# Patient Record
Sex: MALE | Race: WHITE | Employment: FULL TIME | URBAN - METROPOLITAN AREA
[De-identification: names, ages, dates, MRNs, and addresses within clinical notes are randomized per-mention and may not be internally consistent; named-entity substitution may affect disease eponyms.]

---

## 2017-05-26 ENCOUNTER — HOSPITAL ENCOUNTER (OUTPATIENT)
Dept: NON INVASIVE DIAGNOSTICS | Age: 57
Discharge: HOME | End: 2017-05-26
Payer: COMMERCIAL

## 2017-05-26 ENCOUNTER — HOSPITAL ENCOUNTER (OUTPATIENT)
Dept: CT IMAGING | Age: 57
Discharge: HOME | End: 2017-05-26
Payer: COMMERCIAL

## 2017-05-26 DIAGNOSIS — I71.21 ANEURYSM OF ASCENDING AORTA: ICD-10-CM

## 2017-05-26 DIAGNOSIS — I35.0 AORTIC STENOSIS, MODERATE: ICD-10-CM

## 2017-05-26 PROCEDURE — 71275 CT ANGIOGRAPHY CHEST: CPT

## 2017-05-26 PROCEDURE — 6360000004 HC RX CONTRAST MEDICATION: Performed by: RADIOLOGY

## 2017-05-26 PROCEDURE — 93306 TTE W/DOPPLER COMPLETE: CPT

## 2017-05-26 RX ADMIN — IOPAMIDOL 100 ML: 755 INJECTION, SOLUTION INTRAVENOUS at 09:17

## 2017-06-29 ENCOUNTER — OFFICE VISIT (OUTPATIENT)
Dept: CARDIOLOGY | Age: 57
End: 2017-06-29

## 2017-06-29 VITALS
HEIGHT: 67 IN | HEART RATE: 59 BPM | DIASTOLIC BLOOD PRESSURE: 80 MMHG | OXYGEN SATURATION: 98 % | BODY MASS INDEX: 28.88 KG/M2 | SYSTOLIC BLOOD PRESSURE: 130 MMHG | WEIGHT: 184 LBS

## 2017-06-29 DIAGNOSIS — E78.5 DYSLIPIDEMIA: ICD-10-CM

## 2017-06-29 DIAGNOSIS — I10 ESSENTIAL HYPERTENSION: Primary | ICD-10-CM

## 2017-06-29 DIAGNOSIS — Z78.9 STATIN INTOLERANCE: ICD-10-CM

## 2017-06-29 DIAGNOSIS — I71.21 ASCENDING AORTIC ANEURYSM: ICD-10-CM

## 2017-06-29 DIAGNOSIS — I27.20 PULMONARY HYPERTENSION (HCC): ICD-10-CM

## 2017-06-29 PROCEDURE — 99213 OFFICE O/P EST LOW 20 MIN: CPT | Performed by: INTERNAL MEDICINE

## 2017-08-28 ENCOUNTER — OFFICE VISIT (OUTPATIENT)
Dept: PRIMARY CARE CLINIC | Age: 57
End: 2017-08-28

## 2017-08-28 VITALS
OXYGEN SATURATION: 99 % | RESPIRATION RATE: 14 BRPM | BODY MASS INDEX: 29.13 KG/M2 | TEMPERATURE: 98.2 F | HEIGHT: 67 IN | HEART RATE: 68 BPM | WEIGHT: 185.6 LBS | SYSTOLIC BLOOD PRESSURE: 132 MMHG | DIASTOLIC BLOOD PRESSURE: 86 MMHG

## 2017-08-28 DIAGNOSIS — I10 ESSENTIAL HYPERTENSION: Primary | ICD-10-CM

## 2017-08-28 DIAGNOSIS — Z86.73 HX-TIA (TRANSIENT ISCHEMIC ATTACK): ICD-10-CM

## 2017-08-28 DIAGNOSIS — Z23 NEED FOR TDAP VACCINATION: ICD-10-CM

## 2017-08-28 DIAGNOSIS — Z12.5 SCREENING FOR PROSTATE CANCER: ICD-10-CM

## 2017-08-28 DIAGNOSIS — I71.21 ASCENDING AORTIC ANEURYSM: ICD-10-CM

## 2017-08-28 DIAGNOSIS — E78.5 DYSLIPIDEMIA: ICD-10-CM

## 2017-08-28 DIAGNOSIS — Z12.11 COLON CANCER SCREENING: ICD-10-CM

## 2017-08-28 PROCEDURE — 90471 IMMUNIZATION ADMIN: CPT | Performed by: FAMILY MEDICINE

## 2017-08-28 PROCEDURE — 90715 TDAP VACCINE 7 YRS/> IM: CPT | Performed by: FAMILY MEDICINE

## 2017-08-28 PROCEDURE — 99204 OFFICE O/P NEW MOD 45 MIN: CPT | Performed by: FAMILY MEDICINE

## 2017-08-28 RX ORDER — LISINOPRIL 10 MG/1
10 TABLET ORAL DAILY
Qty: 30 TABLET | Refills: 6 | Status: SHIPPED | OUTPATIENT
Start: 2017-08-28 | End: 2018-04-09 | Stop reason: SDUPTHER

## 2017-08-28 RX ORDER — ATORVASTATIN CALCIUM 10 MG/1
10 TABLET, FILM COATED ORAL DAILY
Qty: 30 TABLET | Refills: 3 | Status: SHIPPED | OUTPATIENT
Start: 2017-08-28 | End: 2017-12-08 | Stop reason: SDUPTHER

## 2017-08-28 ASSESSMENT — PATIENT HEALTH QUESTIONNAIRE - PHQ9
SUM OF ALL RESPONSES TO PHQ QUESTIONS 1-9: 0
1. LITTLE INTEREST OR PLEASURE IN DOING THINGS: 0
2. FEELING DOWN, DEPRESSED OR HOPELESS: 0
SUM OF ALL RESPONSES TO PHQ9 QUESTIONS 1 & 2: 0

## 2017-08-28 ASSESSMENT — ENCOUNTER SYMPTOMS
BACK PAIN: 0
EYE DISCHARGE: 0
SHORTNESS OF BREATH: 0
ABDOMINAL DISTENTION: 0
WHEEZING: 0
ABDOMINAL PAIN: 0
APNEA: 0
BLURRED VISION: 0
CHEST TIGHTNESS: 0

## 2017-09-18 ENCOUNTER — ANESTHESIA (OUTPATIENT)
Dept: ENDOSCOPY | Age: 57
End: 2017-09-18
Payer: COMMERCIAL

## 2017-09-18 ENCOUNTER — ANESTHESIA EVENT (OUTPATIENT)
Dept: ENDOSCOPY | Age: 57
End: 2017-09-18
Payer: COMMERCIAL

## 2017-09-18 ENCOUNTER — HOSPITAL ENCOUNTER (OUTPATIENT)
Age: 57
Setting detail: OUTPATIENT SURGERY
Discharge: HOME | End: 2017-09-18
Attending: SPECIALIST | Admitting: SPECIALIST
Payer: COMMERCIAL

## 2017-09-18 VITALS
BODY MASS INDEX: 29.03 KG/M2 | WEIGHT: 185 LBS | HEIGHT: 67 IN | DIASTOLIC BLOOD PRESSURE: 75 MMHG | RESPIRATION RATE: 20 BRPM | OXYGEN SATURATION: 94 % | TEMPERATURE: 97.6 F | SYSTOLIC BLOOD PRESSURE: 128 MMHG | HEART RATE: 62 BPM

## 2017-09-18 VITALS
OXYGEN SATURATION: 99 % | DIASTOLIC BLOOD PRESSURE: 58 MMHG | RESPIRATION RATE: 21 BRPM | SYSTOLIC BLOOD PRESSURE: 99 MMHG

## 2017-09-18 PROCEDURE — 88305 TISSUE EXAM BY PATHOLOGIST: CPT

## 2017-09-18 PROCEDURE — 2500000003 HC RX 250 WO HCPCS: Performed by: NURSE ANESTHETIST, CERTIFIED REGISTERED

## 2017-09-18 PROCEDURE — 3700000000 HC ANESTHESIA ATTENDED CARE: Performed by: SPECIALIST

## 2017-09-18 PROCEDURE — 7100000011 HC PHASE II RECOVERY - ADDTL 15 MIN: Performed by: SPECIALIST

## 2017-09-18 PROCEDURE — 2580000003 HC RX 258: Performed by: SPECIALIST

## 2017-09-18 PROCEDURE — 3609027000 HC COLONOSCOPY: Performed by: SPECIALIST

## 2017-09-18 PROCEDURE — 2580000003 HC RX 258: Performed by: NURSE ANESTHETIST, CERTIFIED REGISTERED

## 2017-09-18 PROCEDURE — 3700000001 HC ADD 15 MINUTES (ANESTHESIA): Performed by: SPECIALIST

## 2017-09-18 PROCEDURE — 6360000002 HC RX W HCPCS: Performed by: NURSE ANESTHETIST, CERTIFIED REGISTERED

## 2017-09-18 PROCEDURE — 7100000010 HC PHASE II RECOVERY - FIRST 15 MIN: Performed by: SPECIALIST

## 2017-09-18 RX ORDER — SODIUM CHLORIDE 0.9 % (FLUSH) 0.9 %
10 SYRINGE (ML) INJECTION PRN
Status: DISCONTINUED | OUTPATIENT
Start: 2017-09-18 | End: 2017-09-18 | Stop reason: HOSPADM

## 2017-09-18 RX ORDER — LIDOCAINE HYDROCHLORIDE 10 MG/ML
1 INJECTION, SOLUTION EPIDURAL; INFILTRATION; INTRACAUDAL; PERINEURAL
Status: DISCONTINUED | OUTPATIENT
Start: 2017-09-18 | End: 2017-09-18 | Stop reason: HOSPADM

## 2017-09-18 RX ORDER — SODIUM CHLORIDE 9 MG/ML
INJECTION, SOLUTION INTRAVENOUS CONTINUOUS
Status: DISCONTINUED | OUTPATIENT
Start: 2017-09-18 | End: 2017-09-18 | Stop reason: HOSPADM

## 2017-09-18 RX ORDER — ONDANSETRON 2 MG/ML
4 INJECTION INTRAMUSCULAR; INTRAVENOUS
Status: DISCONTINUED | OUTPATIENT
Start: 2017-09-18 | End: 2017-09-18 | Stop reason: HOSPADM

## 2017-09-18 RX ORDER — SODIUM CHLORIDE 9 MG/ML
INJECTION, SOLUTION INTRAVENOUS CONTINUOUS PRN
Status: DISCONTINUED | OUTPATIENT
Start: 2017-09-18 | End: 2017-09-18 | Stop reason: SDUPTHER

## 2017-09-18 RX ORDER — SODIUM CHLORIDE 0.9 % (FLUSH) 0.9 %
10 SYRINGE (ML) INJECTION EVERY 12 HOURS SCHEDULED
Status: DISCONTINUED | OUTPATIENT
Start: 2017-09-18 | End: 2017-09-18 | Stop reason: HOSPADM

## 2017-09-18 RX ORDER — LIDOCAINE HYDROCHLORIDE 10 MG/ML
INJECTION, SOLUTION INFILTRATION; PERINEURAL PRN
Status: DISCONTINUED | OUTPATIENT
Start: 2017-09-18 | End: 2017-09-18 | Stop reason: SDUPTHER

## 2017-09-18 RX ORDER — PROPOFOL 10 MG/ML
INJECTION, EMULSION INTRAVENOUS CONTINUOUS PRN
Status: DISCONTINUED | OUTPATIENT
Start: 2017-09-18 | End: 2017-09-18 | Stop reason: SDUPTHER

## 2017-09-18 RX ORDER — PROPOFOL 10 MG/ML
INJECTION, EMULSION INTRAVENOUS PRN
Status: DISCONTINUED | OUTPATIENT
Start: 2017-09-18 | End: 2017-09-18 | Stop reason: SDUPTHER

## 2017-09-18 RX ADMIN — PROPOFOL 100 MCG/KG/MIN: 10 INJECTION, EMULSION INTRAVENOUS at 10:07

## 2017-09-18 RX ADMIN — SODIUM CHLORIDE: 900 INJECTION, SOLUTION INTRAVENOUS at 09:26

## 2017-09-18 RX ADMIN — PROPOFOL 30 MG: 10 INJECTION, EMULSION INTRAVENOUS at 10:08

## 2017-09-18 RX ADMIN — PROPOFOL 50 MG: 10 INJECTION, EMULSION INTRAVENOUS at 10:07

## 2017-09-18 RX ADMIN — SODIUM CHLORIDE: 9 INJECTION, SOLUTION INTRAVENOUS at 09:54

## 2017-09-18 RX ADMIN — LIDOCAINE HYDROCHLORIDE 50 MG: 10 INJECTION, SOLUTION INFILTRATION; PERINEURAL at 10:07

## 2017-09-18 ASSESSMENT — PAIN - FUNCTIONAL ASSESSMENT: PAIN_FUNCTIONAL_ASSESSMENT: 0-10

## 2017-10-12 DIAGNOSIS — I71.21 ASCENDING AORTIC ANEURYSM (HCC): ICD-10-CM

## 2017-10-12 DIAGNOSIS — I10 ESSENTIAL HYPERTENSION: ICD-10-CM

## 2017-10-12 DIAGNOSIS — Z78.9 STATIN INTOLERANCE: ICD-10-CM

## 2017-10-12 DIAGNOSIS — Z12.5 SCREENING FOR PROSTATE CANCER: ICD-10-CM

## 2017-10-12 DIAGNOSIS — E78.5 DYSLIPIDEMIA: ICD-10-CM

## 2017-10-12 LAB
ALBUMIN SERPL-MCNC: 4.4 G/DL (ref 3.9–4.9)
ALP BLD-CCNC: 81 U/L (ref 35–104)
ALT SERPL-CCNC: 26 U/L (ref 0–41)
ANION GAP SERPL CALCULATED.3IONS-SCNC: 15 MEQ/L (ref 7–13)
AST SERPL-CCNC: 28 U/L (ref 0–40)
BILIRUB SERPL-MCNC: 0.6 MG/DL (ref 0–1.2)
BUN BLDV-MCNC: 15 MG/DL (ref 6–20)
CALCIUM SERPL-MCNC: 9.3 MG/DL (ref 8.6–10.2)
CHLORIDE BLD-SCNC: 102 MEQ/L (ref 98–107)
CHOLESTEROL, TOTAL: 142 MG/DL (ref 0–199)
CO2: 24 MEQ/L (ref 22–29)
CREAT SERPL-MCNC: 1.07 MG/DL (ref 0.7–1.2)
GFR AFRICAN AMERICAN: >60
GFR NON-AFRICAN AMERICAN: >60
GLOBULIN: 2.2 G/DL (ref 2.3–3.5)
GLUCOSE BLD-MCNC: 89 MG/DL (ref 74–109)
HCT VFR BLD CALC: 48.1 % (ref 42–52)
HDLC SERPL-MCNC: 43 MG/DL (ref 40–59)
HEMOGLOBIN: 16 G/DL (ref 14–18)
LDL CHOLESTEROL CALCULATED: 74 MG/DL (ref 0–129)
MCH RBC QN AUTO: 27.9 PG (ref 27–31.3)
MCHC RBC AUTO-ENTMCNC: 33.3 % (ref 33–37)
MCV RBC AUTO: 84 FL (ref 80–100)
PDW BLD-RTO: 13.4 % (ref 11.5–14.5)
PLATELET # BLD: 195 K/UL (ref 130–400)
POTASSIUM SERPL-SCNC: 4.7 MEQ/L (ref 3.5–5.1)
PROSTATE SPECIFIC ANTIGEN: 1.1 NG/ML (ref 0–3.89)
RBC # BLD: 5.73 M/UL (ref 4.7–6.1)
SODIUM BLD-SCNC: 141 MEQ/L (ref 132–144)
TOTAL PROTEIN: 6.6 G/DL (ref 6.4–8.1)
TRIGL SERPL-MCNC: 125 MG/DL (ref 0–200)
TSH SERPL DL<=0.05 MIU/L-ACNC: 1.64 UIU/ML (ref 0.27–4.2)
WBC # BLD: 8.8 K/UL (ref 4.8–10.8)

## 2017-10-24 ENCOUNTER — OFFICE VISIT (OUTPATIENT)
Dept: PRIMARY CARE CLINIC | Age: 57
End: 2017-10-24

## 2017-10-24 VITALS
SYSTOLIC BLOOD PRESSURE: 110 MMHG | TEMPERATURE: 97.8 F | WEIGHT: 187 LBS | BODY MASS INDEX: 29.35 KG/M2 | RESPIRATION RATE: 16 BRPM | HEART RATE: 56 BPM | DIASTOLIC BLOOD PRESSURE: 80 MMHG | HEIGHT: 67 IN

## 2017-10-24 DIAGNOSIS — I71.21 ASCENDING AORTIC ANEURYSM: ICD-10-CM

## 2017-10-24 DIAGNOSIS — R53.83 FATIGUE, UNSPECIFIED TYPE: ICD-10-CM

## 2017-10-24 DIAGNOSIS — E78.5 DYSLIPIDEMIA: ICD-10-CM

## 2017-10-24 DIAGNOSIS — Z23 NEED FOR INFLUENZA VACCINATION: ICD-10-CM

## 2017-10-24 DIAGNOSIS — I10 ESSENTIAL HYPERTENSION: Primary | ICD-10-CM

## 2017-10-24 DIAGNOSIS — K63.5 HYPERPLASTIC POLYP OF DESCENDING COLON: ICD-10-CM

## 2017-10-24 PROCEDURE — 90688 IIV4 VACCINE SPLT 0.5 ML IM: CPT | Performed by: FAMILY MEDICINE

## 2017-10-24 PROCEDURE — 90471 IMMUNIZATION ADMIN: CPT | Performed by: FAMILY MEDICINE

## 2017-10-24 PROCEDURE — 99214 OFFICE O/P EST MOD 30 MIN: CPT | Performed by: FAMILY MEDICINE

## 2017-10-24 ASSESSMENT — ENCOUNTER SYMPTOMS
SHORTNESS OF BREATH: 0
BLURRED VISION: 0
WHEEZING: 0

## 2017-12-08 DIAGNOSIS — E78.5 DYSLIPIDEMIA: ICD-10-CM

## 2017-12-08 RX ORDER — ATORVASTATIN CALCIUM 10 MG/1
10 TABLET, FILM COATED ORAL DAILY
Qty: 30 TABLET | Refills: 1 | Status: SHIPPED | OUTPATIENT
Start: 2017-12-08 | End: 2018-01-11 | Stop reason: SDUPTHER

## 2017-12-28 ENCOUNTER — OFFICE VISIT (OUTPATIENT)
Dept: CARDIOLOGY | Age: 57
End: 2017-12-28

## 2017-12-28 VITALS
SYSTOLIC BLOOD PRESSURE: 136 MMHG | WEIGHT: 188.4 LBS | HEART RATE: 60 BPM | BODY MASS INDEX: 29.57 KG/M2 | HEIGHT: 67 IN | DIASTOLIC BLOOD PRESSURE: 88 MMHG

## 2017-12-28 DIAGNOSIS — I35.1 AORTIC VALVE INSUFFICIENCY, ETIOLOGY OF CARDIAC VALVE DISEASE UNSPECIFIED: ICD-10-CM

## 2017-12-28 DIAGNOSIS — I71.21 ASCENDING AORTIC ANEURYSM: ICD-10-CM

## 2017-12-28 DIAGNOSIS — I10 ESSENTIAL HYPERTENSION: Primary | ICD-10-CM

## 2017-12-28 DIAGNOSIS — I27.20 PULMONARY HYPERTENSION (HCC): ICD-10-CM

## 2017-12-28 DIAGNOSIS — I44.7 LBBB (LEFT BUNDLE BRANCH BLOCK): ICD-10-CM

## 2017-12-28 DIAGNOSIS — E78.5 DYSLIPIDEMIA: ICD-10-CM

## 2017-12-28 DIAGNOSIS — I35.0 NONRHEUMATIC AORTIC VALVE STENOSIS: ICD-10-CM

## 2017-12-28 DIAGNOSIS — Z78.9 STATIN INTOLERANCE: ICD-10-CM

## 2017-12-28 PROCEDURE — 3017F COLORECTAL CA SCREEN DOC REV: CPT | Performed by: INTERNAL MEDICINE

## 2017-12-28 PROCEDURE — 99213 OFFICE O/P EST LOW 20 MIN: CPT | Performed by: INTERNAL MEDICINE

## 2017-12-28 PROCEDURE — 1036F TOBACCO NON-USER: CPT | Performed by: INTERNAL MEDICINE

## 2017-12-28 PROCEDURE — G8484 FLU IMMUNIZE NO ADMIN: HCPCS | Performed by: INTERNAL MEDICINE

## 2017-12-28 PROCEDURE — G8417 CALC BMI ABV UP PARAM F/U: HCPCS | Performed by: INTERNAL MEDICINE

## 2017-12-28 PROCEDURE — G8427 DOCREV CUR MEDS BY ELIG CLIN: HCPCS | Performed by: INTERNAL MEDICINE

## 2017-12-28 NOTE — PROGRESS NOTES
Chief Complaint   Patient presents with    Hypertension    Hyperlipidemia     Patient presents for follow up medical evaluation. Patient is followed on a regular basis by Dr. Stan Sykes. Pt denies chest pain, shortness of breath, nausea, vomiting, diarrhea, constipation, motor weakness, insomnia, weight loss, syncope, dizziness, lightheadedness, PND, orthopnea, or claudication. Compliant with meds. S/P 2 D echo which showed mild AS with an estimated ARCHANA of 1.6cm2, with a mean Gradient of 8mmHg, mild to moderate AI, mild LVH, and grade I diastolic dysfunction. Aortic root was 4.0cm.     9-27-12: as above. Patient developed muscle aches in the arms and stoppped Zocor for a while. He restarted it and symptoms returned. Pt denies chest pain, dyspnea, dyspnea on exertion, change in exercise capacity, fatigue, nausea, vomiting, diarrhea, constipation, motor weakness, insomnia, weight loss, syncope, dizziness, lightheadedness, palpitations, PND, orthopnea, or claudication. Patient remains very active in martial arts. 3-28-13: as above, s/p echo with normal LVF, Mild AS and AI, aortic root measuring 4.01cm. CT of chest showed ascending aortic aneurysm measuring 4.2cm. Doing well overall, no recent hospitalization, exercising on a regular without any difficulties. Pt denies chest pain, dyspnea, dyspnea on exertion, change in exercise capacity, fatigue, nausea, vomiting, diarrhea, constipation, motor weakness, insomnia, weight loss, syncope, dizziness, lightheadedness, palpitations, PND, orthopnea, or claudication. 9-24-13: as above, stopped pravachol due to joint aches. Could not tolerate. Pt denies chest pain, dyspnea, dyspnea on exertion, change in exercise capacity, fatigue,  nausea, vomiting, diarrhea, constipation, motor weakness, insomnia, weight loss, syncope, dizziness, lightheadedness, palpitations, PND, orthopnea, or claudication.      4-15-14: as above, Pt denies chest pain, dyspnea, dyspnea on exertion, change in exercise capacity, fatigue,  nausea, vomiting, diarrhea, constipation, motor weakness, insomnia, weight loss, syncope, dizziness, lightheadedness, palpitations, PND, orthopnea, or claudication. Compliant with meds. BP under good control. 5-1-14; as above, s/p echo with normal LVf, grade II DD, mild LVH mild to moderate AS, peak of 38, mean of 18, ARCHANA 1.45cm2, 1-2+ AI, Aneurysmal ascending aorta measuring 4.2cm  Pt denies chest pain, dyspnea, dyspnea on exertion, change in exercise capacity, fatigue,  nausea, vomiting, diarrhea, constipation, motor weakness, insomnia, weight loss, syncope, dizziness, lightheadedness, palpitations, PND, orthopnea, or claudication. 11-13-14: as above, Pt denies chest pain, dyspnea, dyspnea on exertion, change in exercise capacity, fatigue,  nausea, vomiting, diarrhea, constipation, motor weakness, insomnia, weight loss, syncope, dizziness, lightheadedness, palpitations, PND, orthopnea, or claudication. No hospitalizations. No LE edema. States he's active. 6-4-15: as above, s/p CT of chest with ascending aorta arch aneurysm measuring 4.6cm. S/p Echo with EF of 65%, grade II DD, mild AS, AI and MR, ascending aorta measuring 4.4cm. Pt denies chest pain, dyspnea, dyspnea on exertion, change in exercise capacity, fatigue,  nausea, vomiting, diarrhea, constipation, motor weakness, insomnia, weight loss, syncope, dizziness, lightheadedness, palpitations, PND, orthopnea, or claudication. 12-10-15: doing well. Pt denies chest pain, dyspnea, dyspnea on exertion, change in exercise capacity, fatigue,  nausea, vomiting, diarrhea, constipation, motor weakness, insomnia, weight loss, syncope, dizziness, lightheadedness, palpitations, PND, orthopnea, or claudication. No hospitalizations. BP is good today. Having muscle aches and joint pains from statin and stopped pravachol.  Has not had lipid profile since 2012.     6-9-16: Pt denies chest pain, dyspnea, dyspnea on exertion, change in exercise capacity, fatigue,  nausea, vomiting, diarrhea, constipation, motor weakness, insomnia, syncope, dizziness, lightheadedness, palpitations, PND, orthopnea, or claudication. Has lost some weight intentionally. BP is good, HR is good. No LE edema. Lipid profile in 2015 was ok. 6-20-16: CTA of chest with unchanged ascending aorta aneurysm measuring 4.5cm as compared to previous exam on 6/2015.s s/p ECHO with EF of 55%, moderate AS with peak velocity of 3.2m/sec, peak of 41mmHg, mean of 21mmHg, ARCHANA of 1.2cm2. Trace MR, mild AI, moderate LVH, RVSP of 36mmHg. Pt denies chest pain, dyspnea, dyspnea on exertion, change in exercise capacity, fatigue,  nausea, vomiting, diarrhea, constipation, motor weakness, insomnia, weight loss, syncope, dizziness, lightheadedness, palpitations, PND, orthopnea, or claudication. BP is good. 12-29-16: Pt denies chest pain, dyspnea, dyspnea on exertion, change in exercise capacity, fatigue,  nausea, vomiting, diarrhea, constipation, motor weakness, insomnia, weight loss, syncope, dizziness, lightheadedness, palpitations, PND, orthopnea, or claudication. BP and hr are good. No LE discoloration or ulcers. No LE edema. No CHF type symptoms. Lipid profile is abnormal lat check in 12/2015. Not able to tolerate statins. Has tried zocor, pravachol. He gets bad muscle aches. Does have a hx of a mini stroke/TIA.     6-29-17: Pt denies chest pain, dyspnea, dyspnea on exertion, change in exercise capacity, fatigue,  nausea, vomiting, diarrhea, constipation, motor weakness, insomnia, weight loss, syncope, dizziness, lightheadedness, palpitations, PND, orthopnea, or claudication. No nitro use. BP and hr are good. CAD is stable. No LE discoloration or ulcers. No LE edema. No CHF type symptoms. Lipid profile is normal.       S/p ECHO. Normal mitral valve structure and function.   The aortic valve area is 0.9 cm2 with a maximum gradient of 52mmHg and a mean gradient of CALCIUM 9.3 10/12/2017 0816    ALKPHOS 81 10/12/2017 0816    AST 28 10/12/2017 0816    ALT 26 10/12/2017 0816    BILITOT 0.6 10/12/2017 0816            Lab Results   Component Value Date    WBC 8.8 10/12/2017    HGB 16.0 10/12/2017    HCT 48.1 10/12/2017    MCV 84.0 10/12/2017     10/12/2017       No components found for: CHLPL  Lab Results   Component Value Date    TRIG 125 10/12/2017    TRIG 157 12/18/2015    TRIG 145 10/05/2012     Lab Results   Component Value Date    HDL 43 10/12/2017    HDL 44 12/18/2015    HDL 50 10/05/2012     Lab Results   Component Value Date    LDLCALC 74 10/12/2017    LDLCALC 139 12/18/2015    LDLCALC 119 10/05/2012       EKG: NSR, LBBB. ASSESSMENT:    1. Essential hypertension     2. Dyslipidemia     3. LBBB (left bundle branch block)     4. Ascending aortic aneurysm ([address]     5. Pulmonary hypertension-moderate     6. Aortic valve insufficiency, etiology of cardiac valve disease unspecified     7. Statin intolerance           PLAN:     Patient will need to continue to follow up with you for their general medical care and return to see me in the office in 6 months    As always, aggressive risk factor modification is strongly recommended. We should adhere to the [address] VII guidelines for HTN management and the NCEP ATP III guidelines for LDL-C management. The nature of cardiac risk has been fully discussed with this patient. I have made him aware of his LDL target goal given his cardiovascular risk analysis. I have discussed the appropriate diet. The need for lifelong compliance in order to reduce risk is stressed. A regular exercise program is recommended to help achieve and maintain normal body weight, fitness and improve lipid balance. Continue medications at current doses. Will check CTA of chest and ECHO in 6 months    EKG at next office visit.      Patient was advised and encouraged to check blood pressure at home or at a pharmacy, maintain a logbook, and also

## 2018-01-11 DIAGNOSIS — E78.5 DYSLIPIDEMIA: ICD-10-CM

## 2018-01-12 RX ORDER — ATORVASTATIN CALCIUM 10 MG/1
TABLET, FILM COATED ORAL
Qty: 30 TABLET | Refills: 3 | Status: SHIPPED | OUTPATIENT
Start: 2018-01-12 | End: 2018-01-24 | Stop reason: SDUPTHER

## 2018-01-19 DIAGNOSIS — R53.83 FATIGUE, UNSPECIFIED TYPE: ICD-10-CM

## 2018-01-19 DIAGNOSIS — E78.5 DYSLIPIDEMIA: ICD-10-CM

## 2018-01-19 LAB
ALBUMIN SERPL-MCNC: 4.4 G/DL (ref 3.9–4.9)
ALP BLD-CCNC: 91 U/L (ref 35–104)
ALT SERPL-CCNC: 35 U/L (ref 0–41)
ANION GAP SERPL CALCULATED.3IONS-SCNC: 11 MEQ/L (ref 7–13)
AST SERPL-CCNC: 35 U/L (ref 0–40)
BASOPHILS ABSOLUTE: 0.1 K/UL (ref 0–0.2)
BASOPHILS RELATIVE PERCENT: 1.2 %
BILIRUB SERPL-MCNC: 0.5 MG/DL (ref 0–1.2)
BUN BLDV-MCNC: 17 MG/DL (ref 6–20)
CALCIUM SERPL-MCNC: 9.8 MG/DL (ref 8.6–10.2)
CHLORIDE BLD-SCNC: 98 MEQ/L (ref 98–107)
CHOLESTEROL, TOTAL: 139 MG/DL (ref 0–199)
CO2: 29 MEQ/L (ref 22–29)
CREAT SERPL-MCNC: 1.35 MG/DL (ref 0.7–1.2)
EOSINOPHILS ABSOLUTE: 0.4 K/UL (ref 0–0.7)
EOSINOPHILS RELATIVE PERCENT: 4.3 %
GFR AFRICAN AMERICAN: >60
GFR NON-AFRICAN AMERICAN: 54.8
GLOBULIN: 2.5 G/DL (ref 2.3–3.5)
GLUCOSE BLD-MCNC: 88 MG/DL (ref 74–109)
HCT VFR BLD CALC: 48.5 % (ref 42–52)
HDLC SERPL-MCNC: 43 MG/DL (ref 40–59)
HEMOGLOBIN: 16.3 G/DL (ref 14–18)
LDL CHOLESTEROL CALCULATED: 74 MG/DL (ref 0–129)
LYMPHOCYTES ABSOLUTE: 2.1 K/UL (ref 1–4.8)
LYMPHOCYTES RELATIVE PERCENT: 22.8 %
MCH RBC QN AUTO: 28.4 PG (ref 27–31.3)
MCHC RBC AUTO-ENTMCNC: 33.6 % (ref 33–37)
MCV RBC AUTO: 84.5 FL (ref 80–100)
MONOCYTES ABSOLUTE: 0.7 K/UL (ref 0.2–0.8)
MONOCYTES RELATIVE PERCENT: 7.7 %
NEUTROPHILS ABSOLUTE: 6 K/UL (ref 1.4–6.5)
NEUTROPHILS RELATIVE PERCENT: 64 %
PDW BLD-RTO: 13.2 % (ref 11.5–14.5)
PLATELET # BLD: 217 K/UL (ref 130–400)
POTASSIUM SERPL-SCNC: 4.6 MEQ/L (ref 3.5–5.1)
RBC # BLD: 5.73 M/UL (ref 4.7–6.1)
SODIUM BLD-SCNC: 138 MEQ/L (ref 132–144)
TOTAL PROTEIN: 6.9 G/DL (ref 6.4–8.1)
TRIGL SERPL-MCNC: 111 MG/DL (ref 0–200)
WBC # BLD: 9.3 K/UL (ref 4.8–10.8)

## 2018-01-24 ENCOUNTER — OFFICE VISIT (OUTPATIENT)
Dept: PRIMARY CARE CLINIC | Age: 58
End: 2018-01-24
Payer: COMMERCIAL

## 2018-01-24 VITALS
OXYGEN SATURATION: 98 % | DIASTOLIC BLOOD PRESSURE: 78 MMHG | RESPIRATION RATE: 16 BRPM | WEIGHT: 186 LBS | BODY MASS INDEX: 29.19 KG/M2 | HEIGHT: 67 IN | HEART RATE: 57 BPM | TEMPERATURE: 97.8 F | SYSTOLIC BLOOD PRESSURE: 118 MMHG

## 2018-01-24 DIAGNOSIS — E78.5 DYSLIPIDEMIA: ICD-10-CM

## 2018-01-24 DIAGNOSIS — K63.5 HYPERPLASTIC POLYP OF DESCENDING COLON: ICD-10-CM

## 2018-01-24 DIAGNOSIS — I71.21 ASCENDING AORTIC ANEURYSM: ICD-10-CM

## 2018-01-24 DIAGNOSIS — I10 ESSENTIAL HYPERTENSION: Primary | ICD-10-CM

## 2018-01-24 DIAGNOSIS — E55.9 VITAMIN D DEFICIENCY: ICD-10-CM

## 2018-01-24 DIAGNOSIS — Z86.73 HX-TIA (TRANSIENT ISCHEMIC ATTACK): ICD-10-CM

## 2018-01-24 PROCEDURE — 3017F COLORECTAL CA SCREEN DOC REV: CPT | Performed by: FAMILY MEDICINE

## 2018-01-24 PROCEDURE — 99213 OFFICE O/P EST LOW 20 MIN: CPT | Performed by: FAMILY MEDICINE

## 2018-01-24 PROCEDURE — G8417 CALC BMI ABV UP PARAM F/U: HCPCS | Performed by: FAMILY MEDICINE

## 2018-01-24 PROCEDURE — 1036F TOBACCO NON-USER: CPT | Performed by: FAMILY MEDICINE

## 2018-01-24 PROCEDURE — G8484 FLU IMMUNIZE NO ADMIN: HCPCS | Performed by: FAMILY MEDICINE

## 2018-01-24 PROCEDURE — G8427 DOCREV CUR MEDS BY ELIG CLIN: HCPCS | Performed by: FAMILY MEDICINE

## 2018-01-24 RX ORDER — ATORVASTATIN CALCIUM 10 MG/1
TABLET, FILM COATED ORAL
Qty: 30 TABLET | Refills: 5 | Status: SHIPPED | OUTPATIENT
Start: 2018-01-24 | End: 2018-07-23 | Stop reason: SDUPTHER

## 2018-01-24 ASSESSMENT — ENCOUNTER SYMPTOMS
BLURRED VISION: 0
ORTHOPNEA: 0
SHORTNESS OF BREATH: 0

## 2018-01-24 NOTE — PROGRESS NOTES
oz/week     1 Cans of beer per week      Comment: once a month    Drug use: No    Sexual activity: Not Asked     Other Topics Concern    None     Social History Narrative    None     Allergies   Allergen Reactions    Statins Other (See Comments)     Pt states he gets muscle aches       Review of Systems   Eyes: Negative for blurred vision. Respiratory: Negative for shortness of breath. Cardiovascular: Negative for chest pain, palpitations, orthopnea and leg swelling. Musculoskeletal: Negative for neck pain. Neurological: Negative for dizziness and headaches. Vitals:    01/24/18 0733   BP: 118/78   Pulse: 57   Resp: 16   Temp: 97.8 °F (36.6 °C)   TempSrc: Tympanic   SpO2: 98%   Weight: 186 lb (84.4 kg)   Height: 5' 7\" (1.702 m)       Physical Exam       PHYSICAL EXAMINATION:   VITAL SIGNS: are as recorded. GENERAL:  The patient appears well nourished and well-developed, and with normal affect. No acute respiratory distress. Alert and oriented times 3. No skin rashes. HEENT:  TMs normal bilaterally. Canals and ears normal. Pharynx is clear. Extraocular eye motions intact and pain free. Pupils reactive and equally round. Sclerae and conjunctivae clear, normocephalic and atraumatic. RESPIRATORY:  Clear and equal breath sounds with no acute respiratory distress. HEART: Regular rhythm without murmur, rub or gallop. ABDOMEN:  soft, nontender. No masses, guarding or rebound. Normoactive bowel sounds. LOW BACK: No tenderness to palpation of inferior lumbar spine or either sacroiliac joint area. Assessment/Plan  Cathie De Jesus was seen today for hypertension. Diagnoses and all orders for this visit:    Essential hypertension    Dyslipidemia  -     atorvastatin (LIPITOR) 10 MG tablet; TAKE 1 TABLET BY MOUTH ONCE DAILY  -     Comprehensive Metabolic Panel; Future  -     Lipid Panel;  Future    Ascending aortic aneurysm (HCC)    Hx-TIA (transient ischemic

## 2018-04-09 DIAGNOSIS — I10 ESSENTIAL HYPERTENSION: ICD-10-CM

## 2018-04-09 RX ORDER — LISINOPRIL 10 MG/1
10 TABLET ORAL DAILY
Qty: 30 TABLET | Refills: 3 | Status: SHIPPED | OUTPATIENT
Start: 2018-04-09 | End: 2018-08-20 | Stop reason: SDUPTHER

## 2018-05-29 ENCOUNTER — HOSPITAL ENCOUNTER (OUTPATIENT)
Dept: NON INVASIVE DIAGNOSTICS | Age: 58
Discharge: HOME | End: 2018-05-29
Payer: COMMERCIAL

## 2018-05-29 ENCOUNTER — HOSPITAL ENCOUNTER (OUTPATIENT)
Dept: CT IMAGING | Age: 58
Discharge: HOME | End: 2018-05-31
Payer: COMMERCIAL

## 2018-05-29 VITALS — DIASTOLIC BLOOD PRESSURE: 82 MMHG | RESPIRATION RATE: 14 BRPM | SYSTOLIC BLOOD PRESSURE: 141 MMHG | HEART RATE: 63 BPM

## 2018-05-29 DIAGNOSIS — I35.0 NONRHEUMATIC AORTIC VALVE STENOSIS: ICD-10-CM

## 2018-05-29 DIAGNOSIS — I35.1 AORTIC VALVE INSUFFICIENCY, ETIOLOGY OF CARDIAC VALVE DISEASE UNSPECIFIED: ICD-10-CM

## 2018-05-29 DIAGNOSIS — I71.21 ASCENDING AORTIC ANEURYSM: ICD-10-CM

## 2018-05-29 LAB
LV EF: 55 %
LVEF MODALITY: NORMAL

## 2018-05-29 PROCEDURE — 93306 TTE W/DOPPLER COMPLETE: CPT

## 2018-05-29 PROCEDURE — 6360000004 HC RX CONTRAST MEDICATION: Performed by: INTERNAL MEDICINE

## 2018-05-29 PROCEDURE — 71275 CT ANGIOGRAPHY CHEST: CPT

## 2018-05-29 RX ADMIN — IOPAMIDOL 100 ML: 755 INJECTION, SOLUTION INTRAVENOUS at 11:59

## 2018-06-18 DIAGNOSIS — E55.9 VITAMIN D DEFICIENCY: ICD-10-CM

## 2018-06-18 DIAGNOSIS — E78.5 DYSLIPIDEMIA: ICD-10-CM

## 2018-06-18 LAB
ALBUMIN SERPL-MCNC: 4.9 G/DL (ref 3.9–4.9)
ALP BLD-CCNC: 85 U/L (ref 35–104)
ALT SERPL-CCNC: 28 U/L (ref 0–41)
ANION GAP SERPL CALCULATED.3IONS-SCNC: 18 MEQ/L (ref 7–13)
AST SERPL-CCNC: 26 U/L (ref 0–40)
BILIRUB SERPL-MCNC: 0.8 MG/DL (ref 0–1.2)
BUN BLDV-MCNC: 14 MG/DL (ref 6–20)
CALCIUM SERPL-MCNC: 9.6 MG/DL (ref 8.6–10.2)
CHLORIDE BLD-SCNC: 102 MEQ/L (ref 98–107)
CHOLESTEROL, TOTAL: 151 MG/DL (ref 0–199)
CO2: 24 MEQ/L (ref 22–29)
CREAT SERPL-MCNC: 1.27 MG/DL (ref 0.7–1.2)
GFR AFRICAN AMERICAN: >60
GFR NON-AFRICAN AMERICAN: 58.7
GLOBULIN: 2.6 G/DL (ref 2.3–3.5)
GLUCOSE BLD-MCNC: 97 MG/DL (ref 74–109)
HDLC SERPL-MCNC: 48 MG/DL (ref 40–59)
LDL CHOLESTEROL CALCULATED: 81 MG/DL (ref 0–129)
POTASSIUM SERPL-SCNC: 4.6 MEQ/L (ref 3.5–5.1)
SODIUM BLD-SCNC: 144 MEQ/L (ref 132–144)
TOTAL PROTEIN: 7.5 G/DL (ref 6.4–8.1)
TRIGL SERPL-MCNC: 109 MG/DL (ref 0–200)
VITAMIN D 25-HYDROXY: 24.2 NG/ML (ref 30–100)

## 2018-06-25 ENCOUNTER — OFFICE VISIT (OUTPATIENT)
Dept: PRIMARY CARE CLINIC | Age: 58
End: 2018-06-25
Payer: COMMERCIAL

## 2018-06-25 VITALS
RESPIRATION RATE: 14 BRPM | TEMPERATURE: 97.3 F | HEIGHT: 67 IN | OXYGEN SATURATION: 96 % | HEART RATE: 53 BPM | WEIGHT: 184 LBS | BODY MASS INDEX: 28.88 KG/M2 | SYSTOLIC BLOOD PRESSURE: 124 MMHG | DIASTOLIC BLOOD PRESSURE: 84 MMHG

## 2018-06-25 DIAGNOSIS — I35.0 AORTIC STENOSIS, MILD: ICD-10-CM

## 2018-06-25 DIAGNOSIS — R53.83 FATIGUE, UNSPECIFIED TYPE: Primary | ICD-10-CM

## 2018-06-25 DIAGNOSIS — I10 ESSENTIAL HYPERTENSION: ICD-10-CM

## 2018-06-25 DIAGNOSIS — Z12.5 ENCOUNTER FOR PROSTATE CANCER SCREENING: ICD-10-CM

## 2018-06-25 DIAGNOSIS — E55.9 VITAMIN D DEFICIENCY: ICD-10-CM

## 2018-06-25 DIAGNOSIS — E78.5 HYPERLIPIDEMIA, UNSPECIFIED HYPERLIPIDEMIA TYPE: ICD-10-CM

## 2018-06-25 DIAGNOSIS — I71.21 ASCENDING AORTIC ANEURYSM: ICD-10-CM

## 2018-06-25 PROCEDURE — 99214 OFFICE O/P EST MOD 30 MIN: CPT | Performed by: FAMILY MEDICINE

## 2018-06-25 PROCEDURE — G8417 CALC BMI ABV UP PARAM F/U: HCPCS | Performed by: FAMILY MEDICINE

## 2018-06-25 PROCEDURE — 3017F COLORECTAL CA SCREEN DOC REV: CPT | Performed by: FAMILY MEDICINE

## 2018-06-25 PROCEDURE — 1036F TOBACCO NON-USER: CPT | Performed by: FAMILY MEDICINE

## 2018-06-25 PROCEDURE — G8427 DOCREV CUR MEDS BY ELIG CLIN: HCPCS | Performed by: FAMILY MEDICINE

## 2018-06-25 RX ORDER — CHOLECALCIFEROL (VITAMIN D3) 50 MCG
2000 TABLET ORAL DAILY
Qty: 90 TABLET | Refills: 1 | Status: SHIPPED | OUTPATIENT
Start: 2018-06-25

## 2018-06-25 ASSESSMENT — ENCOUNTER SYMPTOMS
APNEA: 0
CONSTIPATION: 0
ORTHOPNEA: 0
EYE PAIN: 0
BLURRED VISION: 0
FACIAL SWELLING: 0
NAUSEA: 0
EYE REDNESS: 0
EYE DISCHARGE: 0
CHOKING: 0
DIARRHEA: 0
CHEST TIGHTNESS: 0
ABDOMINAL PAIN: 0
STRIDOR: 0
WHEEZING: 0
COLOR CHANGE: 0
SHORTNESS OF BREATH: 0
PHOTOPHOBIA: 0

## 2018-06-28 ENCOUNTER — OFFICE VISIT (OUTPATIENT)
Dept: CARDIOLOGY CLINIC | Age: 58
End: 2018-06-28
Payer: COMMERCIAL

## 2018-06-28 VITALS
HEIGHT: 67 IN | DIASTOLIC BLOOD PRESSURE: 70 MMHG | HEART RATE: 57 BPM | SYSTOLIC BLOOD PRESSURE: 120 MMHG | WEIGHT: 184.6 LBS | BODY MASS INDEX: 28.97 KG/M2

## 2018-06-28 DIAGNOSIS — I27.20 PULMONARY HYPERTENSION (HCC): ICD-10-CM

## 2018-06-28 DIAGNOSIS — I44.7 LBBB (LEFT BUNDLE BRANCH BLOCK): ICD-10-CM

## 2018-06-28 DIAGNOSIS — I35.1 AORTIC VALVE INSUFFICIENCY, ETIOLOGY OF CARDIAC VALVE DISEASE UNSPECIFIED: ICD-10-CM

## 2018-06-28 DIAGNOSIS — I10 ESSENTIAL HYPERTENSION: Primary | ICD-10-CM

## 2018-06-28 DIAGNOSIS — I35.0 AORTIC STENOSIS, MILD: Chronic | ICD-10-CM

## 2018-06-28 DIAGNOSIS — I71.21 ASCENDING AORTIC ANEURYSM: ICD-10-CM

## 2018-06-28 PROCEDURE — G8427 DOCREV CUR MEDS BY ELIG CLIN: HCPCS | Performed by: INTERNAL MEDICINE

## 2018-06-28 PROCEDURE — 3017F COLORECTAL CA SCREEN DOC REV: CPT | Performed by: INTERNAL MEDICINE

## 2018-06-28 PROCEDURE — 99214 OFFICE O/P EST MOD 30 MIN: CPT | Performed by: INTERNAL MEDICINE

## 2018-06-28 PROCEDURE — 93000 ELECTROCARDIOGRAM COMPLETE: CPT | Performed by: INTERNAL MEDICINE

## 2018-06-28 PROCEDURE — 1036F TOBACCO NON-USER: CPT | Performed by: INTERNAL MEDICINE

## 2018-06-28 PROCEDURE — G8417 CALC BMI ABV UP PARAM F/U: HCPCS | Performed by: INTERNAL MEDICINE

## 2018-07-23 DIAGNOSIS — E78.5 DYSLIPIDEMIA: ICD-10-CM

## 2018-07-23 RX ORDER — ATORVASTATIN CALCIUM 10 MG/1
TABLET, FILM COATED ORAL
Qty: 30 TABLET | Refills: 5 | Status: SHIPPED | OUTPATIENT
Start: 2018-07-23 | End: 2019-01-17 | Stop reason: SDUPTHER

## 2018-08-20 DIAGNOSIS — I10 ESSENTIAL HYPERTENSION: ICD-10-CM

## 2018-08-20 RX ORDER — LISINOPRIL 10 MG/1
TABLET ORAL
Qty: 30 TABLET | Refills: 3 | Status: SHIPPED | OUTPATIENT
Start: 2018-08-20 | End: 2018-12-16 | Stop reason: SDUPTHER

## 2018-12-16 DIAGNOSIS — I10 ESSENTIAL HYPERTENSION: ICD-10-CM

## 2018-12-16 RX ORDER — LISINOPRIL 10 MG/1
TABLET ORAL
Qty: 30 TABLET | Refills: 3 | Status: SHIPPED | OUTPATIENT
Start: 2018-12-16 | End: 2018-12-27 | Stop reason: SDUPTHER

## 2018-12-27 ENCOUNTER — OFFICE VISIT (OUTPATIENT)
Dept: CARDIOLOGY CLINIC | Age: 58
End: 2018-12-27
Payer: COMMERCIAL

## 2018-12-27 VITALS
BODY MASS INDEX: 29.26 KG/M2 | WEIGHT: 186.4 LBS | HEIGHT: 67 IN | DIASTOLIC BLOOD PRESSURE: 70 MMHG | HEART RATE: 60 BPM | SYSTOLIC BLOOD PRESSURE: 110 MMHG

## 2018-12-27 DIAGNOSIS — I35.0 AORTIC STENOSIS, MILD: Primary | Chronic | ICD-10-CM

## 2018-12-27 DIAGNOSIS — I44.7 LBBB (LEFT BUNDLE BRANCH BLOCK): ICD-10-CM

## 2018-12-27 DIAGNOSIS — I35.1 AORTIC VALVE INSUFFICIENCY, ETIOLOGY OF CARDIAC VALVE DISEASE UNSPECIFIED: ICD-10-CM

## 2018-12-27 DIAGNOSIS — I10 ESSENTIAL HYPERTENSION: ICD-10-CM

## 2018-12-27 DIAGNOSIS — I27.20 PULMONARY HYPERTENSION (HCC): ICD-10-CM

## 2018-12-27 DIAGNOSIS — Z78.9 STATIN INTOLERANCE: ICD-10-CM

## 2018-12-27 DIAGNOSIS — I71.21 ASCENDING AORTIC ANEURYSM: ICD-10-CM

## 2018-12-27 PROCEDURE — 1036F TOBACCO NON-USER: CPT | Performed by: INTERNAL MEDICINE

## 2018-12-27 PROCEDURE — 99214 OFFICE O/P EST MOD 30 MIN: CPT | Performed by: INTERNAL MEDICINE

## 2018-12-27 PROCEDURE — G8484 FLU IMMUNIZE NO ADMIN: HCPCS | Performed by: INTERNAL MEDICINE

## 2018-12-27 PROCEDURE — G8427 DOCREV CUR MEDS BY ELIG CLIN: HCPCS | Performed by: INTERNAL MEDICINE

## 2018-12-27 PROCEDURE — G8417 CALC BMI ABV UP PARAM F/U: HCPCS | Performed by: INTERNAL MEDICINE

## 2018-12-27 PROCEDURE — 3017F COLORECTAL CA SCREEN DOC REV: CPT | Performed by: INTERNAL MEDICINE

## 2018-12-27 RX ORDER — LISINOPRIL 10 MG/1
TABLET ORAL
Qty: 30 TABLET | Refills: 5 | Status: SHIPPED | OUTPATIENT
Start: 2018-12-27 | End: 2019-06-25 | Stop reason: SDUPTHER

## 2018-12-27 NOTE — PROGRESS NOTES
Chief Complaint   Patient presents with    Hypertension     Patient presents for follow up medical evaluation. Patient is followed on a regular basis by Dr. Jabari Champion. Pt denies chest pain, shortness of breath, nausea, vomiting, diarrhea, constipation, motor weakness, insomnia, weight loss, syncope, dizziness, lightheadedness, PND, orthopnea, or claudication. Compliant with meds. S/P 2 D echo which showed mild AS with an estimated ARCHANA of 1.6cm2, with a mean Gradient of 8mmHg, mild to moderate AI, mild LVH, and grade I diastolic dysfunction. Aortic root was 4.0cm.     9-27-12: as above. Patient developed muscle aches in the arms and stoppped Zocor for a while. He restarted it and symptoms returned. Pt denies chest pain, dyspnea, dyspnea on exertion, change in exercise capacity, fatigue, nausea, vomiting, diarrhea, constipation, motor weakness, insomnia, weight loss, syncope, dizziness, lightheadedness, palpitations, PND, orthopnea, or claudication. Patient remains very active in martial arts. 3-28-13: as above, s/p echo with normal LVF, Mild AS and AI, aortic root measuring 4.01cm. CT of chest showed ascending aortic aneurysm measuring 4.2cm. Doing well overall, no recent hospitalization, exercising on a regular without any difficulties. Pt denies chest pain, dyspnea, dyspnea on exertion, change in exercise capacity, fatigue, nausea, vomiting, diarrhea, constipation, motor weakness, insomnia, weight loss, syncope, dizziness, lightheadedness, palpitations, PND, orthopnea, or claudication. 9-24-13: as above, stopped pravachol due to joint aches. Could not tolerate. Pt denies chest pain, dyspnea, dyspnea on exertion, change in exercise capacity, fatigue,  nausea, vomiting, diarrhea, constipation, motor weakness, insomnia, weight loss, syncope, dizziness, lightheadedness, palpitations, PND, orthopnea, or claudication.      4-15-14: as above, Pt denies chest pain, dyspnea, dyspnea on exertion, change in exercise negative  Musculoskeletal ROS: negative  Neurological ROS: no TIA or stroke symptoms  Dermatological ROS: neg      VITALS:  Blood pressure 110/70, pulse 60, height 5' 6.5\" (1.689 m), weight 186 lb 6.4 oz (84.6 kg). Body mass index is 29.64 kg/m². Physical Examination:  General appearance - alert, well appearing, and in no distress  Mental status - alert, oriented to person, place, and time  Neck - Neck is supple, no JVD or carotid bruits. No thyromegaly or adenopathy. Chest - clear to auscultation, no wheezes, rales or rhonchi, symmetric air entry  Heart - normal rate, regular rhythm, normal S1, S2, no murmurs, rubs, clicks or gallops  Abdomen - soft, nontender, nondistended, no masses or organomegaly  Neurological - alert, oriented, normal speech, no focal findings or movement disorder noted  Extremities - peripheral pulses normal, no pedal edema, no clubbing or cyanosis  Skin - normal coloration and turgor, no rashes, no suspicious skin lesions noted    LABS:    Chemistry        Component Value Date/Time     06/18/2018 1157    K 4.6 06/18/2018 1157     06/18/2018 1157    CO2 24 06/18/2018 1157    BUN 14 06/18/2018 1157    CREATININE 1.27 (H) 06/18/2018 1157        Component Value Date/Time    CALCIUM 9.6 06/18/2018 1157    ALKPHOS 85 06/18/2018 1157    AST 26 06/18/2018 1157    ALT 28 06/18/2018 1157    BILITOT 0.8 06/18/2018 1157            Lab Results   Component Value Date    WBC 9.3 01/19/2018    HGB 16.3 01/19/2018    HCT 48.5 01/19/2018    MCV 84.5 01/19/2018     01/19/2018       No components found for: CHLPL  Lab Results   Component Value Date    TRIG 109 06/18/2018    TRIG 111 01/19/2018    TRIG 125 10/12/2017     Lab Results   Component Value Date    HDL 48 06/18/2018    HDL 43 01/19/2018    HDL 43 10/12/2017     Lab Results   Component Value Date    LDLCALC 81 06/18/2018    LDLCALC 74 01/19/2018    LDLCALC 74 10/12/2017       EKG: NSR, LBBB.       ASSESSMENT:     Diagnosis symptoms get worse or do not improve. Thank you for allowing me to participate in the care of your patient, please don't hesitate to contact me if you have any further questions.

## 2018-12-28 ENCOUNTER — OFFICE VISIT (OUTPATIENT)
Dept: PRIMARY CARE CLINIC | Age: 58
End: 2018-12-28
Payer: COMMERCIAL

## 2018-12-28 VITALS
WEIGHT: 188 LBS | RESPIRATION RATE: 15 BRPM | BODY MASS INDEX: 29.51 KG/M2 | SYSTOLIC BLOOD PRESSURE: 118 MMHG | DIASTOLIC BLOOD PRESSURE: 76 MMHG | HEART RATE: 57 BPM | OXYGEN SATURATION: 97 % | HEIGHT: 67 IN | TEMPERATURE: 97.2 F

## 2018-12-28 DIAGNOSIS — I10 ESSENTIAL HYPERTENSION: ICD-10-CM

## 2018-12-28 DIAGNOSIS — R53.83 FATIGUE, UNSPECIFIED TYPE: ICD-10-CM

## 2018-12-28 DIAGNOSIS — I71.21 ASCENDING AORTIC ANEURYSM: Primary | ICD-10-CM

## 2018-12-28 DIAGNOSIS — Z12.5 ENCOUNTER FOR PROSTATE CANCER SCREENING: ICD-10-CM

## 2018-12-28 DIAGNOSIS — J30.2 SEASONAL ALLERGIES: ICD-10-CM

## 2018-12-28 DIAGNOSIS — K63.5 HYPERPLASTIC POLYP OF DESCENDING COLON: ICD-10-CM

## 2018-12-28 DIAGNOSIS — E78.5 DYSLIPIDEMIA: ICD-10-CM

## 2018-12-28 DIAGNOSIS — E55.9 VITAMIN D DEFICIENCY: ICD-10-CM

## 2018-12-28 DIAGNOSIS — I35.1 AORTIC VALVE INSUFFICIENCY, ETIOLOGY OF CARDIAC VALVE DISEASE UNSPECIFIED: ICD-10-CM

## 2018-12-28 DIAGNOSIS — I00 RHEUMATIC FEVER: ICD-10-CM

## 2018-12-28 DIAGNOSIS — E78.5 HYPERLIPIDEMIA, UNSPECIFIED HYPERLIPIDEMIA TYPE: ICD-10-CM

## 2018-12-28 LAB
ALBUMIN SERPL-MCNC: 4.7 G/DL (ref 3.9–4.9)
ALP BLD-CCNC: 82 U/L (ref 35–104)
ALT SERPL-CCNC: 26 U/L (ref 0–41)
ANION GAP SERPL CALCULATED.3IONS-SCNC: 14 MEQ/L (ref 7–13)
AST SERPL-CCNC: 24 U/L (ref 0–40)
BASOPHILS ABSOLUTE: 0.2 K/UL (ref 0–0.2)
BASOPHILS RELATIVE PERCENT: 1.3 %
BILIRUB SERPL-MCNC: 0.8 MG/DL (ref 0–1.2)
BUN BLDV-MCNC: 21 MG/DL (ref 6–20)
CALCIUM SERPL-MCNC: 9.7 MG/DL (ref 8.6–10.2)
CHLORIDE BLD-SCNC: 100 MEQ/L (ref 98–107)
CHOLESTEROL, TOTAL: 149 MG/DL (ref 0–199)
CO2: 28 MEQ/L (ref 22–29)
CREAT SERPL-MCNC: 1.29 MG/DL (ref 0.7–1.2)
EOSINOPHILS ABSOLUTE: 0.5 K/UL (ref 0–0.7)
EOSINOPHILS RELATIVE PERCENT: 4 %
GFR AFRICAN AMERICAN: >60
GFR NON-AFRICAN AMERICAN: 57.6
GLOBULIN: 2.7 G/DL (ref 2.3–3.5)
GLUCOSE BLD-MCNC: 85 MG/DL (ref 74–109)
HCT VFR BLD CALC: 52.8 % (ref 42–52)
HDLC SERPL-MCNC: 43 MG/DL (ref 40–59)
HEMOGLOBIN: 17.6 G/DL (ref 14–18)
LDL CHOLESTEROL CALCULATED: 81 MG/DL (ref 0–129)
LYMPHOCYTES ABSOLUTE: 2.1 K/UL (ref 1–4.8)
LYMPHOCYTES RELATIVE PERCENT: 15.9 %
MCH RBC QN AUTO: 28.3 PG (ref 27–31.3)
MCHC RBC AUTO-ENTMCNC: 33.4 % (ref 33–37)
MCV RBC AUTO: 84.7 FL (ref 80–100)
MONOCYTES ABSOLUTE: 1 K/UL (ref 0.2–0.8)
MONOCYTES RELATIVE PERCENT: 7.4 %
NEUTROPHILS ABSOLUTE: 9.2 K/UL (ref 1.4–6.5)
NEUTROPHILS RELATIVE PERCENT: 71.4 %
PDW BLD-RTO: 13.5 % (ref 11.5–14.5)
PLATELET # BLD: 220 K/UL (ref 130–400)
POTASSIUM SERPL-SCNC: 4.9 MEQ/L (ref 3.5–5.1)
PROSTATE SPECIFIC ANTIGEN: 1.54 NG/ML (ref 0–3.89)
RBC # BLD: 6.23 M/UL (ref 4.7–6.1)
SODIUM BLD-SCNC: 142 MEQ/L (ref 132–144)
TOTAL PROTEIN: 7.4 G/DL (ref 6.4–8.1)
TRIGL SERPL-MCNC: 125 MG/DL (ref 0–200)
VITAMIN D 25-HYDROXY: 21.9 NG/ML (ref 30–100)
WBC # BLD: 12.9 K/UL (ref 4.8–10.8)

## 2018-12-28 PROCEDURE — G8427 DOCREV CUR MEDS BY ELIG CLIN: HCPCS | Performed by: FAMILY MEDICINE

## 2018-12-28 PROCEDURE — 99214 OFFICE O/P EST MOD 30 MIN: CPT | Performed by: FAMILY MEDICINE

## 2018-12-28 PROCEDURE — G8417 CALC BMI ABV UP PARAM F/U: HCPCS | Performed by: FAMILY MEDICINE

## 2018-12-28 PROCEDURE — 3017F COLORECTAL CA SCREEN DOC REV: CPT | Performed by: FAMILY MEDICINE

## 2018-12-28 PROCEDURE — G8484 FLU IMMUNIZE NO ADMIN: HCPCS | Performed by: FAMILY MEDICINE

## 2018-12-28 PROCEDURE — 1036F TOBACCO NON-USER: CPT | Performed by: FAMILY MEDICINE

## 2018-12-28 ASSESSMENT — ENCOUNTER SYMPTOMS
COLOR CHANGE: 0
BLURRED VISION: 0
EYE REDNESS: 0
EYE DISCHARGE: 0
CHOKING: 0
FACIAL SWELLING: 0
WHEEZING: 0
EYE PAIN: 0
STRIDOR: 0
ORTHOPNEA: 0
PHOTOPHOBIA: 0
DIARRHEA: 0
CONSTIPATION: 0
CHEST TIGHTNESS: 0
ABDOMINAL PAIN: 0
NAUSEA: 0
SHORTNESS OF BREATH: 0
APNEA: 0

## 2018-12-28 ASSESSMENT — PATIENT HEALTH QUESTIONNAIRE - PHQ9
2. FEELING DOWN, DEPRESSED OR HOPELESS: 0
1. LITTLE INTEREST OR PLEASURE IN DOING THINGS: 0
SUM OF ALL RESPONSES TO PHQ QUESTIONS 1-9: 0
SUM OF ALL RESPONSES TO PHQ QUESTIONS 1-9: 0
SUM OF ALL RESPONSES TO PHQ9 QUESTIONS 1 & 2: 0

## 2019-01-17 DIAGNOSIS — E78.5 DYSLIPIDEMIA: ICD-10-CM

## 2019-01-17 RX ORDER — ATORVASTATIN CALCIUM 10 MG/1
TABLET, FILM COATED ORAL
Qty: 30 TABLET | Refills: 5 | Status: SHIPPED | OUTPATIENT
Start: 2019-01-17 | End: 2019-06-25 | Stop reason: SDUPTHER

## 2019-05-28 ENCOUNTER — HOSPITAL ENCOUNTER (OUTPATIENT)
Dept: NON INVASIVE DIAGNOSTICS | Age: 59
Discharge: HOME | End: 2019-05-28
Payer: COMMERCIAL

## 2019-05-28 ENCOUNTER — HOSPITAL ENCOUNTER (OUTPATIENT)
Dept: CT IMAGING | Age: 59
Discharge: HOME | End: 2019-05-30
Payer: COMMERCIAL

## 2019-05-28 VITALS
HEIGHT: 67 IN | DIASTOLIC BLOOD PRESSURE: 71 MMHG | WEIGHT: 144 LBS | SYSTOLIC BLOOD PRESSURE: 116 MMHG | RESPIRATION RATE: 16 BRPM | BODY MASS INDEX: 22.6 KG/M2

## 2019-05-28 DIAGNOSIS — I35.1 AORTIC VALVE INSUFFICIENCY, ETIOLOGY OF CARDIAC VALVE DISEASE UNSPECIFIED: ICD-10-CM

## 2019-05-28 LAB
LV EF: 60 %
LVEF MODALITY: NORMAL

## 2019-05-28 PROCEDURE — 6360000004 HC RX CONTRAST MEDICATION: Performed by: FAMILY MEDICINE

## 2019-05-28 PROCEDURE — 71260 CT THORAX DX C+: CPT

## 2019-05-28 PROCEDURE — 93306 TTE W/DOPPLER COMPLETE: CPT

## 2019-05-28 RX ORDER — SODIUM CHLORIDE 0.9 % (FLUSH) 0.9 %
10 SYRINGE (ML) INJECTION
Status: DISPENSED | OUTPATIENT
Start: 2019-05-28 | End: 2019-05-28

## 2019-05-28 RX ADMIN — IOPAMIDOL 100 ML: 755 INJECTION, SOLUTION INTRAVENOUS at 07:47

## 2019-06-25 ENCOUNTER — OFFICE VISIT (OUTPATIENT)
Dept: FAMILY MEDICINE CLINIC | Age: 59
End: 2019-06-25
Payer: COMMERCIAL

## 2019-06-25 VITALS
SYSTOLIC BLOOD PRESSURE: 119 MMHG | HEIGHT: 67 IN | TEMPERATURE: 97.3 F | RESPIRATION RATE: 14 BRPM | WEIGHT: 143 LBS | HEART RATE: 73 BPM | OXYGEN SATURATION: 95 % | BODY MASS INDEX: 22.44 KG/M2 | DIASTOLIC BLOOD PRESSURE: 81 MMHG

## 2019-06-25 DIAGNOSIS — E78.5 DYSLIPIDEMIA: ICD-10-CM

## 2019-06-25 DIAGNOSIS — Z11.59 ENCOUNTER FOR HEPATITIS C SCREENING TEST FOR LOW RISK PATIENT: ICD-10-CM

## 2019-06-25 DIAGNOSIS — Z00.00 PREVENTATIVE HEALTH CARE: ICD-10-CM

## 2019-06-25 DIAGNOSIS — Z12.5 PROSTATE CANCER SCREENING: ICD-10-CM

## 2019-06-25 DIAGNOSIS — E55.9 VITAMIN D DEFICIENCY: ICD-10-CM

## 2019-06-25 DIAGNOSIS — I10 ESSENTIAL HYPERTENSION: Primary | ICD-10-CM

## 2019-06-25 PROCEDURE — 3017F COLORECTAL CA SCREEN DOC REV: CPT | Performed by: INTERNAL MEDICINE

## 2019-06-25 PROCEDURE — G8420 CALC BMI NORM PARAMETERS: HCPCS | Performed by: INTERNAL MEDICINE

## 2019-06-25 PROCEDURE — 99214 OFFICE O/P EST MOD 30 MIN: CPT | Performed by: INTERNAL MEDICINE

## 2019-06-25 PROCEDURE — 1036F TOBACCO NON-USER: CPT | Performed by: INTERNAL MEDICINE

## 2019-06-25 PROCEDURE — G8427 DOCREV CUR MEDS BY ELIG CLIN: HCPCS | Performed by: INTERNAL MEDICINE

## 2019-06-25 RX ORDER — LISINOPRIL 10 MG/1
TABLET ORAL
Qty: 30 TABLET | Refills: 11 | Status: SHIPPED | OUTPATIENT
Start: 2019-06-25 | End: 2020-07-16

## 2019-06-25 RX ORDER — ATORVASTATIN CALCIUM 10 MG/1
10 TABLET, FILM COATED ORAL DAILY
Qty: 30 TABLET | Refills: 11 | Status: SHIPPED | OUTPATIENT
Start: 2019-06-25 | End: 2019-12-19

## 2019-06-25 ASSESSMENT — PATIENT HEALTH QUESTIONNAIRE - PHQ9
SUM OF ALL RESPONSES TO PHQ QUESTIONS 1-9: 0
2. FEELING DOWN, DEPRESSED OR HOPELESS: 0
SUM OF ALL RESPONSES TO PHQ9 QUESTIONS 1 & 2: 0
SUM OF ALL RESPONSES TO PHQ QUESTIONS 1-9: 0
1. LITTLE INTEREST OR PLEASURE IN DOING THINGS: 0

## 2019-06-25 NOTE — PROGRESS NOTES
Future    Vitamin D deficiency  -     Vitamin D 25 Hydroxy; Future    Prostate cancer screening  -     PSA Screening; Future    Preventative health care  -     PSA Screening; Future  -     Lipid Panel; Future  -     Comprehensive Metabolic Panel; Future  -     CBC With Auto Differential; Future  -     Hepatitis C Antibody; Future    Encounter for hepatitis C screening test for low risk patient  -     Hepatitis C Antibody; Future        No follow-ups on file.     Zhang Granger MD

## 2019-06-27 ENCOUNTER — OFFICE VISIT (OUTPATIENT)
Dept: CARDIOLOGY CLINIC | Age: 59
End: 2019-06-27
Payer: COMMERCIAL

## 2019-06-27 VITALS
RESPIRATION RATE: 14 BRPM | SYSTOLIC BLOOD PRESSURE: 122 MMHG | DIASTOLIC BLOOD PRESSURE: 74 MMHG | WEIGHT: 142.2 LBS | BODY MASS INDEX: 22.61 KG/M2 | OXYGEN SATURATION: 98 % | HEART RATE: 60 BPM

## 2019-06-27 DIAGNOSIS — I35.1 AORTIC VALVE INSUFFICIENCY, ETIOLOGY OF CARDIAC VALVE DISEASE UNSPECIFIED: ICD-10-CM

## 2019-06-27 DIAGNOSIS — I71.21 ASCENDING AORTIC ANEURYSM: ICD-10-CM

## 2019-06-27 DIAGNOSIS — I35.0 AORTIC STENOSIS, MILD: Primary | Chronic | ICD-10-CM

## 2019-06-27 DIAGNOSIS — I44.7 LBBB (LEFT BUNDLE BRANCH BLOCK): ICD-10-CM

## 2019-06-27 DIAGNOSIS — I10 ESSENTIAL HYPERTENSION: ICD-10-CM

## 2019-06-27 DIAGNOSIS — I27.20 PULMONARY HYPERTENSION (HCC): ICD-10-CM

## 2019-06-27 DIAGNOSIS — E78.5 DYSLIPIDEMIA: ICD-10-CM

## 2019-06-27 DIAGNOSIS — Z78.9 STATIN INTOLERANCE: ICD-10-CM

## 2019-06-27 PROCEDURE — 93000 ELECTROCARDIOGRAM COMPLETE: CPT | Performed by: INTERNAL MEDICINE

## 2019-06-27 PROCEDURE — 99214 OFFICE O/P EST MOD 30 MIN: CPT | Performed by: INTERNAL MEDICINE

## 2019-06-27 PROCEDURE — G8427 DOCREV CUR MEDS BY ELIG CLIN: HCPCS | Performed by: INTERNAL MEDICINE

## 2019-06-27 PROCEDURE — 3017F COLORECTAL CA SCREEN DOC REV: CPT | Performed by: INTERNAL MEDICINE

## 2019-06-27 PROCEDURE — G8420 CALC BMI NORM PARAMETERS: HCPCS | Performed by: INTERNAL MEDICINE

## 2019-06-27 PROCEDURE — 1036F TOBACCO NON-USER: CPT | Performed by: INTERNAL MEDICINE

## 2019-07-01 ASSESSMENT — ENCOUNTER SYMPTOMS
VOICE CHANGE: 0
PHOTOPHOBIA: 0
SHORTNESS OF BREATH: 0
VOMITING: 0
CHOKING: 0
NAUSEA: 0
TROUBLE SWALLOWING: 0

## 2019-12-19 ENCOUNTER — OFFICE VISIT (OUTPATIENT)
Dept: CARDIOLOGY CLINIC | Age: 59
End: 2019-12-19
Payer: COMMERCIAL

## 2019-12-19 VITALS
SYSTOLIC BLOOD PRESSURE: 110 MMHG | WEIGHT: 154.8 LBS | BODY MASS INDEX: 24.61 KG/M2 | DIASTOLIC BLOOD PRESSURE: 80 MMHG | HEART RATE: 77 BPM | OXYGEN SATURATION: 98 %

## 2019-12-19 DIAGNOSIS — I71.21 ASCENDING AORTIC ANEURYSM: ICD-10-CM

## 2019-12-19 DIAGNOSIS — E78.5 DYSLIPIDEMIA: ICD-10-CM

## 2019-12-19 DIAGNOSIS — Z78.9 STATIN INTOLERANCE: Primary | ICD-10-CM

## 2019-12-19 DIAGNOSIS — I35.0 AORTIC STENOSIS, MILD: Chronic | ICD-10-CM

## 2019-12-19 DIAGNOSIS — I35.1 AORTIC VALVE INSUFFICIENCY, ETIOLOGY OF CARDIAC VALVE DISEASE UNSPECIFIED: ICD-10-CM

## 2019-12-19 DIAGNOSIS — I10 ESSENTIAL HYPERTENSION: ICD-10-CM

## 2019-12-19 DIAGNOSIS — I44.7 LBBB (LEFT BUNDLE BRANCH BLOCK): ICD-10-CM

## 2019-12-19 PROCEDURE — 1036F TOBACCO NON-USER: CPT | Performed by: INTERNAL MEDICINE

## 2019-12-19 PROCEDURE — G8420 CALC BMI NORM PARAMETERS: HCPCS | Performed by: INTERNAL MEDICINE

## 2019-12-19 PROCEDURE — 3017F COLORECTAL CA SCREEN DOC REV: CPT | Performed by: INTERNAL MEDICINE

## 2019-12-19 PROCEDURE — G8427 DOCREV CUR MEDS BY ELIG CLIN: HCPCS | Performed by: INTERNAL MEDICINE

## 2019-12-19 PROCEDURE — 99214 OFFICE O/P EST MOD 30 MIN: CPT | Performed by: INTERNAL MEDICINE

## 2019-12-19 PROCEDURE — G8484 FLU IMMUNIZE NO ADMIN: HCPCS | Performed by: INTERNAL MEDICINE

## 2019-12-27 ENCOUNTER — OFFICE VISIT (OUTPATIENT)
Dept: PRIMARY CARE CLINIC | Age: 59
End: 2019-12-27
Payer: COMMERCIAL

## 2019-12-27 VITALS
DIASTOLIC BLOOD PRESSURE: 80 MMHG | RESPIRATION RATE: 14 BRPM | TEMPERATURE: 97.8 F | BODY MASS INDEX: 25.11 KG/M2 | WEIGHT: 160 LBS | SYSTOLIC BLOOD PRESSURE: 114 MMHG | HEIGHT: 67 IN

## 2019-12-27 DIAGNOSIS — I10 ESSENTIAL HYPERTENSION: ICD-10-CM

## 2019-12-27 DIAGNOSIS — Z12.5 PROSTATE CANCER SCREENING: ICD-10-CM

## 2019-12-27 DIAGNOSIS — E78.5 DYSLIPIDEMIA: ICD-10-CM

## 2019-12-27 DIAGNOSIS — Z00.00 PREVENTATIVE HEALTH CARE: Primary | ICD-10-CM

## 2019-12-27 PROCEDURE — 99396 PREV VISIT EST AGE 40-64: CPT | Performed by: INTERNAL MEDICINE

## 2019-12-27 PROCEDURE — G8484 FLU IMMUNIZE NO ADMIN: HCPCS | Performed by: INTERNAL MEDICINE

## 2019-12-27 SDOH — ECONOMIC STABILITY: FOOD INSECURITY: WITHIN THE PAST 12 MONTHS, YOU WORRIED THAT YOUR FOOD WOULD RUN OUT BEFORE YOU GOT MONEY TO BUY MORE.: NEVER TRUE

## 2019-12-27 SDOH — ECONOMIC STABILITY: FOOD INSECURITY: WITHIN THE PAST 12 MONTHS, THE FOOD YOU BOUGHT JUST DIDN'T LAST AND YOU DIDN'T HAVE MONEY TO GET MORE.: NEVER TRUE

## 2019-12-27 SDOH — ECONOMIC STABILITY: INCOME INSECURITY: HOW HARD IS IT FOR YOU TO PAY FOR THE VERY BASICS LIKE FOOD, HOUSING, MEDICAL CARE, AND HEATING?: NOT HARD AT ALL

## 2020-01-16 DIAGNOSIS — E78.5 DYSLIPIDEMIA: ICD-10-CM

## 2020-01-16 DIAGNOSIS — Z12.5 PROSTATE CANCER SCREENING: ICD-10-CM

## 2020-01-16 DIAGNOSIS — Z00.00 PREVENTATIVE HEALTH CARE: ICD-10-CM

## 2020-01-16 DIAGNOSIS — I10 ESSENTIAL HYPERTENSION: ICD-10-CM

## 2020-01-16 LAB
ALBUMIN SERPL-MCNC: 5.4 G/DL (ref 3.5–4.6)
ALP BLD-CCNC: 63 U/L (ref 35–104)
ALT SERPL-CCNC: 16 U/L (ref 0–41)
ANION GAP SERPL CALCULATED.3IONS-SCNC: 15 MEQ/L (ref 9–15)
AST SERPL-CCNC: 27 U/L (ref 0–40)
BILIRUB SERPL-MCNC: 1.5 MG/DL (ref 0.2–0.7)
BUN BLDV-MCNC: 17 MG/DL (ref 6–20)
CALCIUM SERPL-MCNC: 10.3 MG/DL (ref 8.5–9.9)
CHLORIDE BLD-SCNC: 93 MEQ/L (ref 95–107)
CHOLESTEROL, TOTAL: 220 MG/DL (ref 0–199)
CO2: 25 MEQ/L (ref 20–31)
CREAT SERPL-MCNC: 1.35 MG/DL (ref 0.7–1.2)
GFR AFRICAN AMERICAN: >60
GFR NON-AFRICAN AMERICAN: 54.4
GLOBULIN: 2.5 G/DL (ref 2.3–3.5)
GLUCOSE BLD-MCNC: 97 MG/DL (ref 70–99)
HDLC SERPL-MCNC: 62 MG/DL (ref 40–59)
LDL CHOLESTEROL CALCULATED: 140 MG/DL (ref 0–129)
POTASSIUM SERPL-SCNC: 4.7 MEQ/L (ref 3.4–4.9)
PROSTATE SPECIFIC ANTIGEN: 1.69 NG/ML (ref 0–3.89)
SODIUM BLD-SCNC: 133 MEQ/L (ref 135–144)
TOTAL PROTEIN: 7.9 G/DL (ref 6.3–8)
TRIGL SERPL-MCNC: 90 MG/DL (ref 0–150)

## 2020-12-24 ENCOUNTER — VIRTUAL VISIT (OUTPATIENT)
Dept: PRIMARY CARE CLINIC | Age: 60
End: 2020-12-24
Payer: COMMERCIAL

## 2020-12-24 PROCEDURE — 99442 PR PHYS/QHP TELEPHONE EVALUATION 11-20 MIN: CPT | Performed by: INTERNAL MEDICINE

## 2020-12-24 RX ORDER — LISINOPRIL 10 MG/1
10 TABLET ORAL DAILY
Qty: 90 TABLET | Refills: 3 | Status: SHIPPED | OUTPATIENT
Start: 2020-12-24

## 2020-12-24 ASSESSMENT — PATIENT HEALTH QUESTIONNAIRE - PHQ9
2. FEELING DOWN, DEPRESSED OR HOPELESS: 0
SUM OF ALL RESPONSES TO PHQ9 QUESTIONS 1 & 2: 0
SUM OF ALL RESPONSES TO PHQ QUESTIONS 1-9: 0
SUM OF ALL RESPONSES TO PHQ QUESTIONS 1-9: 0
1. LITTLE INTEREST OR PLEASURE IN DOING THINGS: 0
SUM OF ALL RESPONSES TO PHQ QUESTIONS 1-9: 0

## 2020-12-24 NOTE — PROGRESS NOTES
Phone  Eloina Dennis is a 64 y.o. male evaluated via telephone on 12/24/2020. Consent:  He and/or health care decision maker is aware that that he may receive a bill for this telephone service, depending on his insurance coverage, and has provided verbal consent to proceed: Yes   Eloina Dennis 64 y.o. male presents today with No chief complaint on file.       HPI    Past Medical History:   Diagnosis Date    Aortic valve [id number]-[id number]+ 4/15/2014    [facility]/15/2014    Dyslipidemia     HTN (hypertension)     Hx-TIA (transient ischemic attack)     Rheumatic fever     Seasonal allergies     Statin intolerance 6/9/2016     Patient Active Problem List    Diagnosis Date Noted    Hyperplastic polyp of descending colon 10/24/2017    Statin intolerance 06/09/2016    Aortic valve [id number]-5-6+ 04/15/2014    Ascending aortic aneurysm ([facility] 04/15/2014    Aortic stenosis, mild 03/08/2012    Pulmonary hypertension-moderate 03/08/2012    LBBB (left bundle branch block) 03/08/2012    HTN (hypertension)     Dyslipidemia     Hx-TIA (transient ischemic attack)     Rheumatic fever     Seasonal allergies      Past Surgical History:   Procedure Laterality Date    CARDIAC SURGERY      KS COLON CA SCRN NOT HI [facility] IND N/A 9/18/2017    COLONOSCOPY performed by Zeferino Fraire MD at [address]     Family History   Problem Relation Age of Onset    Diabetes Father      Social History     Socioeconomic History    Marital status:      Spouse name: Not on file    Number of children: Not on file    Years of education: Not on file    Highest education level: Not on file   Occupational History    Not on file   Social Needs    Financial resource strain: Not hard at all   [place] insecurity     Worry: Never true     Inability: Never true   [employer] needs     Medical: Not on file     Non-medical: Not on file   Tobacco Use Total Time: minutes: 11-20 minutes    Note: not billable if this call serves to triage the patient into an appointment for the relevant concern      Herbie Fritz

## 2021-01-05 ENCOUNTER — OFFICE VISIT (OUTPATIENT)
Dept: CARDIOLOGY CLINIC | Age: 61
End: 2021-01-05
Payer: COMMERCIAL

## 2021-01-05 VITALS
TEMPERATURE: 97.2 F | RESPIRATION RATE: 16 BRPM | DIASTOLIC BLOOD PRESSURE: 88 MMHG | BODY MASS INDEX: 29.41 KG/M2 | HEART RATE: 55 BPM | WEIGHT: 185 LBS | OXYGEN SATURATION: 98 % | SYSTOLIC BLOOD PRESSURE: 142 MMHG

## 2021-01-05 DIAGNOSIS — R01.1 MURMUR: ICD-10-CM

## 2021-01-05 DIAGNOSIS — R09.89 BILATERAL CAROTID BRUITS: ICD-10-CM

## 2021-01-05 DIAGNOSIS — I10 ESSENTIAL HYPERTENSION: Primary | ICD-10-CM

## 2021-01-05 PROCEDURE — 3017F COLORECTAL CA SCREEN DOC REV: CPT | Performed by: INTERNAL MEDICINE

## 2021-01-05 PROCEDURE — 93000 ELECTROCARDIOGRAM COMPLETE: CPT | Performed by: INTERNAL MEDICINE

## 2021-01-05 PROCEDURE — 99214 OFFICE O/P EST MOD 30 MIN: CPT | Performed by: INTERNAL MEDICINE

## 2021-01-05 PROCEDURE — G8427 DOCREV CUR MEDS BY ELIG CLIN: HCPCS | Performed by: INTERNAL MEDICINE

## 2021-01-05 PROCEDURE — G8484 FLU IMMUNIZE NO ADMIN: HCPCS | Performed by: INTERNAL MEDICINE

## 2021-01-05 PROCEDURE — 1036F TOBACCO NON-USER: CPT | Performed by: INTERNAL MEDICINE

## 2021-01-05 PROCEDURE — G8419 CALC BMI OUT NRM PARAM NOF/U: HCPCS | Performed by: INTERNAL MEDICINE

## 2021-01-05 NOTE — PROGRESS NOTES
Chief Complaint   Patient presents with    1 Year Follow Up    Heart Murmur     Patient presents for follow up medical evaluation. Patient is followed on a regular basis by Dr. Yue Curiel. Pt denies chest pain, shortness of breath, nausea, vomiting, diarrhea, constipation, motor weakness, insomnia, weight loss, syncope, dizziness, lightheadedness, PND, orthopnea, or claudication. Compliant with meds. S/P 2 D echo which showed mild AS with an estimated ARCHANA of 1.6cm2, with a mean Gradient of 8mmHg, mild to moderate AI, mild LVH, and grade I diastolic dysfunction. Aortic root was 4.0cm.     9-27-12: as above. Patient developed muscle aches in the arms and stoppped Zocor for a while. He restarted it and symptoms returned. Pt denies chest pain, dyspnea, dyspnea on exertion, change in exercise capacity, fatigue, nausea, vomiting, diarrhea, constipation, motor weakness, insomnia, weight loss, syncope, dizziness, lightheadedness, palpitations, PND, orthopnea, or claudication. Patient remains very active in martial arts. 3-28-13: as above, s/p echo with normal LVF, Mild AS and AI, aortic root measuring 4.01cm. CT of chest showed ascending aortic aneurysm measuring 4.2cm. Doing well overall, no recent hospitalization, exercising on a regular without any difficulties. Pt denies chest pain, dyspnea, dyspnea on exertion, change in exercise capacity, fatigue, nausea, vomiting, diarrhea, constipation, motor weakness, insomnia, weight loss, syncope, dizziness, lightheadedness, palpitations, PND, orthopnea, or claudication. 9-24-13: as above, stopped pravachol due to joint aches. Could not tolerate. Pt denies chest pain, dyspnea, dyspnea on exertion, change in exercise capacity, fatigue,  nausea, vomiting, diarrhea, constipation, motor weakness, insomnia, weight loss, syncope, dizziness, lightheadedness, palpitations, PND, orthopnea, or claudication.      4-15-14: as above, Pt denies chest pain, dyspnea, dyspnea on exertion, change in exercise capacity, fatigue,  nausea, vomiting, diarrhea, constipation, motor weakness, insomnia, syncope, dizziness, lightheadedness, palpitations, PND, orthopnea, or claudication. Has lost some weight intentionally. BP is good, HR is good. No LE edema. Lipid profile in 2015 was ok. 6-20-16: CTA of chest with unchanged ascending aorta aneurysm measuring 4.5cm as compared to previous exam on 6/2015.s s/p ECHO with EF of 55%, moderate AS with peak velocity of 3.2m/sec, peak of 41mmHg, mean of 21mmHg, ARCHANA of 1.2cm2. Trace MR, mild AI, moderate LVH, RVSP of 36mmHg. Pt denies chest pain, dyspnea, dyspnea on exertion, change in exercise capacity, fatigue,  nausea, vomiting, diarrhea, constipation, motor weakness, insomnia, weight loss, syncope, dizziness, lightheadedness, palpitations, PND, orthopnea, or claudication. BP is good. 12-29-16: Pt denies chest pain, dyspnea, dyspnea on exertion, change in exercise capacity, fatigue,  nausea, vomiting, diarrhea, constipation, motor weakness, insomnia, weight loss, syncope, dizziness, lightheadedness, palpitations, PND, orthopnea, or claudication. BP and hr are good. No LE discoloration or ulcers. No LE edema. No CHF type symptoms. Lipid profile is abnormal lat check in 12/2015. Not able to tolerate statins. Has tried zocor, pravachol. He gets bad muscle aches. Does have a hx of a mini stroke/TIA.     6-29-17: Pt denies chest pain, dyspnea, dyspnea on exertion, change in exercise capacity, fatigue,  nausea, vomiting, diarrhea, constipation, motor weakness, insomnia, weight loss, syncope, dizziness, lightheadedness, palpitations, PND, orthopnea, or claudication. No nitro use. BP and hr are good. CAD is stable. No LE discoloration or ulcers. No LE edema. No CHF type symptoms. Lipid profile is normal.       S/p ECHO. Normal mitral valve structure and function.   The aortic valve area is 0.9 cm2 with a maximum gradient of 52mmHg and a mean gradient of 24mmHg. Moderately severe AS. Normal tricuspid valve structure and function. There is mild ( 1 +) tricuspid regurgitation with estimated RVSP of 40 mm Hg. Normal pulmonic valve structure and function. Moderately dilated left atrium. Left ventricular ejection fraction is visually estimated at 55%. Pseudonormal filling pattern noted. Moderate concentric left ventricular hypertrophy. Normal right atrium. Normal right ventricle structure and function. Dilation of ascending aorta 4.9 cm  No evidence of pericardial effusion. No evidence of pleural effusion. S/p CTA of chest.          Impression   1. STABLE 4.5 CM ASCENDING THORACIC AORTA ANEURYSM UNCHANGED FROM 6/21/2016.   2. NO CTA EVIDENCE OF A PULMONARY EMBOLISM OR AORTIC DISSECTION. 3. MILD CARDIOMEGALY WITHOUT A PERICARDIAL EFFUSION. 4. LUNGS APPEAR CLEAR.             12-28-17: FOLLOWING WITH DR. Niall Damico NOW. Pt denies chest pain, dyspnea, dyspnea on exertion, change in exercise capacity, fatigue,  nausea, vomiting, diarrhea, constipation, motor weakness, insomnia, weight loss, syncope, dizziness, lightheadedness, palpitations, PND, orthopnea, or claudication. No nitro use. BP and hr are good. CAD is stable. No LE discoloration or ulcers. No LE edema. No CHF type symptoms. Lipid profile is normal.  He is active at work and home without any issues. Compliant with meds. Able to tolerate 10mg lipitor now. No muscle cramps. 6-28-18: EKG with NSR, LBBB. S/p ECHO with EF of 55%, MILD AS, MILD AI, GRADE II DD. S/P CTA of chest with ascending aorta measuring 4.6cm, unchanged from previous. Pt denies chest pain, dyspnea, dyspnea on exertion, change in exercise capacity, fatigue,  nausea, vomiting, diarrhea, constipation, motor weakness, insomnia, weight loss, syncope, dizziness, lightheadedness, palpitations, PND, orthopnea, or claudication. No nitro use. BP and hr are good. CAD is stable. No LE discoloration or ulcers. No LE edema.  No CHF type symptoms. Lipid profile is normal. No recent hospitalization. No change in meds. 12-27-18: doing well overall. Pt denies chest pain, dyspnea, dyspnea on exertion, change in exercise capacity, fatigue,  nausea, vomiting, diarrhea, constipation, motor weakness, insomnia, weight loss, syncope, dizziness, lightheadedness, palpitations, PND, orthopnea, or claudication. No nitro use. BP and hr are good. No LE discoloration or ulcers. No LE edema. No CHF type symptoms. Lipid profile is normal. No recent hospitalization. No change in meds. Able to tolerate Lipitor so far this time. 6-27-19: Pt denies chest pain, dyspnea, dyspnea on exertion, change in exercise capacity, fatigue,  nausea, vomiting, diarrhea, constipation, motor weakness, insomnia, weight loss, syncope, dizziness, lightheadedness, palpitations, PND, orthopnea, or claudication. No nitro use. BP and hr are good. CAD is stable. No LE discoloration or ulcers. No LE edema. No CHF type symptoms. Lipid profile is normal. No recent hospitalization. No change in meds. Has CKD, stage III. EKG with NSR, LBBB. S/p CTA of chest with stable Ectatic ascending thoracic aorta with maximum diameter of 4.4 cm. S/p ECHO with EF of 60%, 1-2+ AI, 2+ TR, RVSP of 49mmHg, mild to mod PHNT, grade I DD, moderate AS, mean gradient of 33mmHg, velocity of 3.49m/s, 0.95cm2 ARCHANA. 12-19-19: Pt denies chest pain, dyspnea, dyspnea on exertion, change in exercise capacity, fatigue,  nausea, vomiting, diarrhea, constipation, motor weakness, insomnia, weight loss, syncope, dizziness, lightheadedness, palpitations, PND, orthopnea, or claudication. No nitro use. BP and hr are good. CAD is stable. No LE discoloration or ulcers. No LE edema. No CHF type symptoms. Lipid profile is normal. No recent hospitalization. No change in meds. Hx of bicuspid AV and Ascending aorta aneurysm. Could not tolerate Atorvastatin due to muscle aches/joint pain.  LDL is 81.     1/5/2021: Patient with history of thoracic aortic aneurysm as well as bicuspid aortic valve with  moderate aortic stenosis. Pt denies chest pain, dyspnea, dyspnea on exertion, change in exercise capacity, fatigue,  nausea, vomiting, diarrhea, constipation, motor weakness, insomnia, weight loss, syncope, dizziness, lightheadedness, palpitations, PND, orthopnea, or claudication. Blood pressure is mildly elevated at 142/88 today. Patient with history of bicuspid aortic valve. EKG with sinus bradycardia with left bundle branch block. Blood pressure is usually pretty good at home. No recent hospitalizations. Patient is active without any issues. Patient Active Problem List   Diagnosis    HTN (hypertension)    Dyslipidemia    Hx-TIA (transient ischemic attack)    Rheumatic fever    Seasonal allergies    Aortic stenosis, mild    Pulmonary hypertension-moderate    LBBB (left bundle branch block)    Aortic valve xenrqvytopydg-3-9+    Ascending aortic aneurysm (HCC)    Statin intolerance    Hyperplastic polyp of descending colon       Current Outpatient Medications   Medication Sig Dispense Refill    lisinopril (PRINIVIL;ZESTRIL) 10 MG tablet Take 1 tablet by mouth daily 90 tablet 3    metoprolol tartrate (LOPRESSOR) 25 MG tablet Take 1 tablet by mouth 2 times daily 180 tablet 3    Cholecalciferol (VITAMIN D) 2000 units TABS tablet Take 1 tablet by mouth daily 90 tablet 1    aspirin 81 MG EC tablet Take 81 mg by mouth daily. No current facility-administered medications for this visit. ALLERGIES: Statins    Review of Systems:  General ROS: negative  Psychological ROS: negative  Hematological and Lymphatic ROS: No history of blood clots or bleeding disorder.    Respiratory ROS: no cough, shortness of breath, or wheezing  Cardiovascular ROS: no chest pain or dyspnea on exertion  Gastrointestinal ROS: no abdominal pain, change in bowel habits, or black or bloody stools  Genito-Urinary ROS: negative  Musculoskeletal ROS: negative  Neurological ROS: no TIA or stroke symptoms  Dermatological ROS: neg      VITALS:  Blood pressure (!) 142/88, pulse 55, temperature 97.2 °F (36.2 °C), temperature source Infrared, resp. rate 16, weight 185 lb (83.9 kg), SpO2 98 %. Body mass index is 29.41 kg/m². Physical Examination:  General appearance - alert, well appearing, and in no distress  Mental status - alert, oriented to person, place, and time  Neck - Neck is supple, no JVD or carotid bruits. No thyromegaly or adenopathy.    Chest - clear to auscultation, no wheezes, rales or rhonchi, symmetric air entry  Heart - normal rate, regular rhythm, normal S1, S2, no murmurs, rubs, clicks or gallops  Abdomen - soft, nontender, nondistended, no masses or organomegaly  Neurological - alert, oriented, normal speech, no focal findings or movement disorder noted  Extremities - peripheral pulses normal, no pedal edema, no clubbing or cyanosis  Skin - normal coloration and turgor, no rashes, no suspicious skin lesions noted    LABS:    Chemistry        Component Value Date/Time     (L) 01/16/2020 0910    K 4.7 01/16/2020 0910    CL 93 (L) 01/16/2020 0910    CO2 25 01/16/2020 0910    BUN 17 01/16/2020 0910    CREATININE 1.35 (H) 01/16/2020 0910        Component Value Date/Time    CALCIUM 10.3 (H) 01/16/2020 0910    ALKPHOS 63 01/16/2020 0910    AST 27 01/16/2020 0910    ALT 16 01/16/2020 0910    BILITOT 1.5 (H) 01/16/2020 0910            Lab Results   Component Value Date    WBC 12.9 (H) 12/28/2018    HGB 17.6 12/28/2018    HCT 52.8 (H) 12/28/2018    MCV 84.7 12/28/2018     12/28/2018       No components found for: CHLPL  Lab Results   Component Value Date    TRIG 90 01/16/2020    TRIG 125 12/28/2018    TRIG 109 06/18/2018     Lab Results   Component Value Date    HDL 62 (H) 01/16/2020    HDL 43 12/28/2018    HDL 48 06/18/2018     Lab Results   Component Value Date    LDLCALC 140 (H) 01/16/2020    LDLCALC 81 12/28/2018    LDLCALC 81 06/18/2018       EKG: NSR, LBBB. ASSESSMENT:     Diagnosis Orders   1. Aortic stenosis, moderate    2. Essential hypertension  lisinopril (PRINIVIL;ZESTRIL) 10 MG tablet    metoprolol tartrate (LOPRESSOR) 25 MG tablet   3. Ascending aortic aneurysm ([address]     4. Aortic valve insufficiency, etiology of cardiac valve disease unspecified     5. Pulmonary hypertension-moderate     6. LBBB (left bundle branch block)     7. Statin intolerance           PLAN:     Patient will need to continue to follow up with you for their general medical care and return to see me in the office in 6 months    As always, aggressive risk factor modification is strongly recommended. We should adhere to the [address] guidelines for HTN management and the NCEP ATP III guidelines for LDL-C management. The nature of cardiac risk has been fully discussed with this patient. I have made him aware of his LDL target goal given his cardiovascular risk analysis. I have discussed the appropriate diet. The need for lifelong compliance in order to reduce risk is stressed. A regular exercise program is recommended to help achieve and maintain normal body weight, fitness and improve lipid balance. Continue medications at current doses. Will check CTA of chest in 6 months    Check echocardiogram given history of bicuspid aortic valve, moderate aortic stenosis and ascending aortic aneurysm. CUS due to ? Bruit vs radiation of murmur. EKG     Avoid nSAIDS. Patient was advised and encouraged to check blood pressure at home or at a pharmacy, maintain a logbook, and also call us back if blood pressure are above the target ranges or if it is low. Patient clearly understands and agrees to the instructions. We will need to continue to monitor muscle and liver enzymes, BUN, CR, and electrolytes.     Details of medical condition explained and patient was warned about adverse consequences of uncontrolled medical conditions and possible side effects of prescribed medications. Patient was advised to go to the ER if he starts experiencing adverse effects of the medications. patient was instructed to call us back or go to nearby emergency room immediately if symptoms get worse or do not improve. Thank you for allowing me to participate in the care of your patient, please don't hesitate to contact me if you have any further questions.

## 2021-01-13 ENCOUNTER — HOSPITAL ENCOUNTER (OUTPATIENT)
Dept: ULTRASOUND IMAGING | Age: 61
Discharge: HOME | End: 2021-01-15
Payer: COMMERCIAL

## 2021-01-13 ENCOUNTER — HOSPITAL ENCOUNTER (OUTPATIENT)
Dept: NON INVASIVE DIAGNOSTICS | Age: 61
Discharge: HOME | End: 2021-01-13
Payer: COMMERCIAL

## 2021-01-13 DIAGNOSIS — R01.1 MURMUR: ICD-10-CM

## 2021-01-13 DIAGNOSIS — R09.89 BILATERAL CAROTID BRUITS: ICD-10-CM

## 2021-01-13 LAB
LV EF: 48 %
LVEF MODALITY: NORMAL

## 2021-01-13 PROCEDURE — 93306 TTE W/DOPPLER COMPLETE: CPT

## 2021-01-13 PROCEDURE — 93880 EXTRACRANIAL BILAT STUDY: CPT

## 2021-01-16 PROCEDURE — 93880 EXTRACRANIAL BILAT STUDY: CPT | Performed by: INTERNAL MEDICINE

## 2021-08-03 ENCOUNTER — OFFICE VISIT (OUTPATIENT)
Dept: CARDIOLOGY CLINIC | Age: 61
End: 2021-08-03
Payer: COMMERCIAL

## 2021-08-03 VITALS
HEART RATE: 70 BPM | BODY MASS INDEX: 22.58 KG/M2 | OXYGEN SATURATION: 99 % | SYSTOLIC BLOOD PRESSURE: 98 MMHG | RESPIRATION RATE: 16 BRPM | DIASTOLIC BLOOD PRESSURE: 64 MMHG | WEIGHT: 142 LBS

## 2021-08-03 DIAGNOSIS — I35.0 NONRHEUMATIC AORTIC VALVE STENOSIS: ICD-10-CM

## 2021-08-03 DIAGNOSIS — I71.21 ASCENDING AORTIC ANEURYSM: ICD-10-CM

## 2021-08-03 DIAGNOSIS — I10 ESSENTIAL HYPERTENSION: Primary | ICD-10-CM

## 2021-08-03 DIAGNOSIS — E78.5 DYSLIPIDEMIA: ICD-10-CM

## 2021-08-03 DIAGNOSIS — I35.1 AORTIC VALVE INSUFFICIENCY, ETIOLOGY OF CARDIAC VALVE DISEASE UNSPECIFIED: ICD-10-CM

## 2021-08-03 PROCEDURE — 93000 ELECTROCARDIOGRAM COMPLETE: CPT | Performed by: INTERNAL MEDICINE

## 2021-08-03 PROCEDURE — G8427 DOCREV CUR MEDS BY ELIG CLIN: HCPCS | Performed by: INTERNAL MEDICINE

## 2021-08-03 PROCEDURE — 3017F COLORECTAL CA SCREEN DOC REV: CPT | Performed by: INTERNAL MEDICINE

## 2021-08-03 PROCEDURE — 99214 OFFICE O/P EST MOD 30 MIN: CPT | Performed by: INTERNAL MEDICINE

## 2021-08-03 PROCEDURE — G8420 CALC BMI NORM PARAMETERS: HCPCS | Performed by: INTERNAL MEDICINE

## 2021-08-03 PROCEDURE — 1036F TOBACCO NON-USER: CPT | Performed by: INTERNAL MEDICINE

## 2021-08-03 NOTE — PROGRESS NOTES
Chief Complaint   Patient presents with    6 Month Follow-Up    Hyperlipidemia     LDL-140    Hypertension     Patient presents for follow up medical evaluation. Patient is followed on a regular basis by Dr. Jason Sumner. Pt denies chest pain, shortness of breath, nausea, vomiting, diarrhea, constipation, motor weakness, insomnia, weight loss, syncope, dizziness, lightheadedness, PND, orthopnea, or claudication. Compliant with meds. S/P 2 D echo which showed mild AS with an estimated ARCHANA of 1.6cm2, with a mean Gradient of 8mmHg, mild to moderate AI, mild LVH, and grade I diastolic dysfunction. Aortic root was 4.0cm.     9-27-12: as above. Patient developed muscle aches in the arms and stoppped Zocor for a while. He restarted it and symptoms returned. Pt denies chest pain, dyspnea, dyspnea on exertion, change in exercise capacity, fatigue, nausea, vomiting, diarrhea, constipation, motor weakness, insomnia, weight loss, syncope, dizziness, lightheadedness, palpitations, PND, orthopnea, or claudication. Patient remains very active in martial arts. 3-28-13: as above, s/p echo with normal LVF, Mild AS and AI, aortic root measuring 4.01cm. CT of chest showed ascending aortic aneurysm measuring 4.2cm. Doing well overall, no recent hospitalization, exercising on a regular without any difficulties. Pt denies chest pain, dyspnea, dyspnea on exertion, change in exercise capacity, fatigue, nausea, vomiting, diarrhea, constipation, motor weakness, insomnia, weight loss, syncope, dizziness, lightheadedness, palpitations, PND, orthopnea, or claudication. 9-24-13: as above, stopped pravachol due to joint aches. Could not tolerate. Pt denies chest pain, dyspnea, dyspnea on exertion, change in exercise capacity, fatigue,  nausea, vomiting, diarrhea, constipation, motor weakness, insomnia, weight loss, syncope, dizziness, lightheadedness, palpitations, PND, orthopnea, or claudication.      4-15-14: as above, Pt denies chest pain, dyspnea, dyspnea on exertion, change in exercise capacity, fatigue,  nausea, vomiting, diarrhea, constipation, motor weakness, insomnia, weight loss, syncope, dizziness, lightheadedness, palpitations, PND, orthopnea, or claudication. Compliant with meds. BP under good control. 5-1-14; as above, s/p echo with normal LVf, grade II DD, mild LVH mild to moderate AS, peak of 38, mean of 18, ARCHANA 1.45cm2, 1-2+ AI, Aneurysmal ascending aorta measuring 4.2cm  Pt denies chest pain, dyspnea, dyspnea on exertion, change in exercise capacity, fatigue,  nausea, vomiting, diarrhea, constipation, motor weakness, insomnia, weight loss, syncope, dizziness, lightheadedness, palpitations, PND, orthopnea, or claudication. 11-13-14: as above, Pt denies chest pain, dyspnea, dyspnea on exertion, change in exercise capacity, fatigue,  nausea, vomiting, diarrhea, constipation, motor weakness, insomnia, weight loss, syncope, dizziness, lightheadedness, palpitations, PND, orthopnea, or claudication. No hospitalizations. No LE edema. States he's active. 6-4-15: as above, s/p CT of chest with ascending aorta arch aneurysm measuring 4.6cm. S/p Echo with EF of 65%, grade II DD, mild AS, AI and MR, ascending aorta measuring 4.4cm. Pt denies chest pain, dyspnea, dyspnea on exertion, change in exercise capacity, fatigue,  nausea, vomiting, diarrhea, constipation, motor weakness, insomnia, weight loss, syncope, dizziness, lightheadedness, palpitations, PND, orthopnea, or claudication. 12-10-15: doing well. Pt denies chest pain, dyspnea, dyspnea on exertion, change in exercise capacity, fatigue,  nausea, vomiting, diarrhea, constipation, motor weakness, insomnia, weight loss, syncope, dizziness, lightheadedness, palpitations, PND, orthopnea, or claudication. No hospitalizations. BP is good today. Having muscle aches and joint pains from statin and stopped pravachol.  Has not had lipid profile since 2012.     6-9-16: Pt denies chest pain, dyspnea, dyspnea on exertion, change in exercise capacity, fatigue,  nausea, vomiting, diarrhea, constipation, motor weakness, insomnia, syncope, dizziness, lightheadedness, palpitations, PND, orthopnea, or claudication. Has lost some weight intentionally. BP is good, HR is good. No LE edema. Lipid profile in 2015 was ok. 6-20-16: CTA of chest with unchanged ascending aorta aneurysm measuring 4.5cm as compared to previous exam on 6/2015.s s/p ECHO with EF of 55%, moderate AS with peak velocity of 3.2m/sec, peak of 41mmHg, mean of 21mmHg, ARCHANA of 1.2cm2. Trace MR, mild AI, moderate LVH, RVSP of 36mmHg. Pt denies chest pain, dyspnea, dyspnea on exertion, change in exercise capacity, fatigue,  nausea, vomiting, diarrhea, constipation, motor weakness, insomnia, weight loss, syncope, dizziness, lightheadedness, palpitations, PND, orthopnea, or claudication. BP is good. 12-29-16: Pt denies chest pain, dyspnea, dyspnea on exertion, change in exercise capacity, fatigue,  nausea, vomiting, diarrhea, constipation, motor weakness, insomnia, weight loss, syncope, dizziness, lightheadedness, palpitations, PND, orthopnea, or claudication. BP and hr are good. No LE discoloration or ulcers. No LE edema. No CHF type symptoms. Lipid profile is abnormal lat check in 12/2015. Not able to tolerate statins. Has tried zocor, pravachol. He gets bad muscle aches. Does have a hx of a mini stroke/TIA.     6-29-17: Pt denies chest pain, dyspnea, dyspnea on exertion, change in exercise capacity, fatigue,  nausea, vomiting, diarrhea, constipation, motor weakness, insomnia, weight loss, syncope, dizziness, lightheadedness, palpitations, PND, orthopnea, or claudication. No nitro use. BP and hr are good. CAD is stable. No LE discoloration or ulcers. No LE edema. No CHF type symptoms. Lipid profile is normal.       S/p ECHO. Normal mitral valve structure and function.   The aortic valve area is 0.9 cm2 with a maximum gradient of 52mmHg and a mean gradient of 24mmHg. Moderately severe AS. Normal tricuspid valve structure and function. There is mild ( 1 +) tricuspid regurgitation with estimated RVSP of 40 mm Hg. Normal pulmonic valve structure and function. Moderately dilated left atrium. Left ventricular ejection fraction is visually estimated at 55%. Pseudonormal filling pattern noted. Moderate concentric left ventricular hypertrophy. Normal right atrium. Normal right ventricle structure and function. Dilation of ascending aorta 4.9 cm  No evidence of pericardial effusion. No evidence of pleural effusion. S/p CTA of chest.          Impression   1. STABLE 4.5 CM ASCENDING THORACIC AORTA ANEURYSM UNCHANGED FROM 6/21/2016.   2. NO CTA EVIDENCE OF A PULMONARY EMBOLISM OR AORTIC DISSECTION. 3. MILD CARDIOMEGALY WITHOUT A PERICARDIAL EFFUSION. 4. LUNGS APPEAR CLEAR.             12-28-17: FOLLOWING WITH DR. Aleena Glover NOW. Pt denies chest pain, dyspnea, dyspnea on exertion, change in exercise capacity, fatigue,  nausea, vomiting, diarrhea, constipation, motor weakness, insomnia, weight loss, syncope, dizziness, lightheadedness, palpitations, PND, orthopnea, or claudication. No nitro use. BP and hr are good. CAD is stable. No LE discoloration or ulcers. No LE edema. No CHF type symptoms. Lipid profile is normal.  He is active at work and home without any issues. Compliant with meds. Able to tolerate 10mg lipitor now. No muscle cramps. 6-28-18: EKG with NSR, LBBB. S/p ECHO with EF of 55%, MILD AS, MILD AI, GRADE II DD. S/P CTA of chest with ascending aorta measuring 4.6cm, unchanged from previous. Pt denies chest pain, dyspnea, dyspnea on exertion, change in exercise capacity, fatigue,  nausea, vomiting, diarrhea, constipation, motor weakness, insomnia, weight loss, syncope, dizziness, lightheadedness, palpitations, PND, orthopnea, or claudication. No nitro use. BP and hr are good. CAD is stable. No LE discoloration or ulcers.  No LE edema. No CHF type symptoms. Lipid profile is normal. No recent hospitalization. No change in meds. 12-27-18: doing well overall. Pt denies chest pain, dyspnea, dyspnea on exertion, change in exercise capacity, fatigue,  nausea, vomiting, diarrhea, constipation, motor weakness, insomnia, weight loss, syncope, dizziness, lightheadedness, palpitations, PND, orthopnea, or claudication. No nitro use. BP and hr are good. No LE discoloration or ulcers. No LE edema. No CHF type symptoms. Lipid profile is normal. No recent hospitalization. No change in meds. Able to tolerate Lipitor so far this time. 6-27-19: Pt denies chest pain, dyspnea, dyspnea on exertion, change in exercise capacity, fatigue,  nausea, vomiting, diarrhea, constipation, motor weakness, insomnia, weight loss, syncope, dizziness, lightheadedness, palpitations, PND, orthopnea, or claudication. No nitro use. BP and hr are good. CAD is stable. No LE discoloration or ulcers. No LE edema. No CHF type symptoms. Lipid profile is normal. No recent hospitalization. No change in meds. Has CKD, stage III. EKG with NSR, LBBB. S/p CTA of chest with stable Ectatic ascending thoracic aorta with maximum diameter of 4.4 cm. S/p ECHO with EF of 60%, 1-2+ AI, 2+ TR, RVSP of 49mmHg, mild to mod PHNT, grade I DD, moderate AS, mean gradient of 33mmHg, velocity of 3.49m/s, 0.95cm2 ARCHANA. 12-19-19: Pt denies chest pain, dyspnea, dyspnea on exertion, change in exercise capacity, fatigue,  nausea, vomiting, diarrhea, constipation, motor weakness, insomnia, weight loss, syncope, dizziness, lightheadedness, palpitations, PND, orthopnea, or claudication. No nitro use. BP and hr are good. CAD is stable. No LE discoloration or ulcers. No LE edema. No CHF type symptoms. Lipid profile is normal. No recent hospitalization. No change in meds. Hx of bicuspid AV and Ascending aorta aneurysm. Could not tolerate Atorvastatin due to muscle aches/joint pain. LDL is 81. 1/5/2021: Patient with history of thoracic aortic aneurysm as well as bicuspid aortic valve with  moderate aortic stenosis. Pt denies chest pain, dyspnea, dyspnea on exertion, change in exercise capacity, fatigue,  nausea, vomiting, diarrhea, constipation, motor weakness, insomnia, weight loss, syncope, dizziness, lightheadedness, palpitations, PND, orthopnea, or claudication. Blood pressure is mildly elevated at 142/88 today. Patient with history of bicuspid aortic valve. EKG with sinus bradycardia with left bundle branch block. Blood pressure is usually pretty good at home. No recent hospitalizations. Patient is active without any issues. 8-3-21: s/p ECHO in 1/2021 with EF of 45-50%, severe AS, peak of 72, mean of 23mmhg, ARCHANA 0.85cm2,   S/p CUS with less than 50% b/l stenosis. Pt denies chest pain, dyspnea, dyspnea on exertion, change in exercise capacity, fatigue,  nausea, vomiting, diarrhea, constipation, motor weakness, insomnia, weight loss, syncope, dizziness, lightheadedness, palpitations, PND, orthopnea, or claudication. . BP and hr are good. No LE discoloration or ulcers. No LE edema. No CHF type symptoms. Lipid profile is normal. No recent hospitalization. No change in meds. EKG with NSR, LBBB.        Patient Active Problem List   Diagnosis    HTN (hypertension)    Dyslipidemia    Hx-TIA (transient ischemic attack)    Rheumatic fever    Seasonal allergies    Aortic stenosis, mild    Pulmonary hypertension-moderate    LBBB (left bundle branch block)    Aortic valve [id number]+    Ascending aortic aneurysm (HCC)    Statin intolerance    Hyperplastic polyp of descending colon       Current Outpatient Medications   Medication Sig Dispense Refill    lisinopril (PRINIVIL;ZESTRIL) 10 MG tablet Take 1 tablet by mouth daily 90 tablet 3    metoprolol tartrate (LOPRESSOR) 25 MG tablet Take 1 tablet by mouth 2 times daily 180 tablet 3    Cholecalciferol (VITAMIN D) 2000 units TABS tablet Take 1 tablet by mouth daily 90 tablet 1    aspirin 81 MG EC tablet Take 81 mg by mouth daily. No current facility-administered medications for this visit. ALLERGIES: Statins    Review of Systems:  General ROS: negative  Psychological ROS: negative  Hematological and Lymphatic ROS: No history of blood clots or bleeding disorder. Respiratory ROS: no cough, shortness of breath, or wheezing  Cardiovascular ROS: no chest pain or dyspnea on exertion  Gastrointestinal ROS: no abdominal pain, change in bowel habits, or black or bloody stools  Genito-Urinary ROS: negative  Musculoskeletal ROS: negative  Neurological ROS: no TIA or stroke symptoms  Dermatological ROS: neg      VITALS:  Blood pressure 98/64, pulse 70, resp. rate 16, weight 142 lb (64.4 kg), SpO2 99 %. Body mass index is 22.58 kg/m². Physical Examination:  General appearance - alert, well appearing, and in no distress  Mental status - alert, oriented to person, place, and time  Neck - Neck is supple, no JVD or carotid bruits. No thyromegaly or adenopathy.    Chest - clear to auscultation, no wheezes, rales or rhonchi, symmetric air entry  Heart - normal rate, regular rhythm, normal S1, S2, + 3/6 systolic murmurs, rubs, clicks or gallops  Abdomen - soft, nontender, nondistended, no masses or organomegaly  Neurological - alert, oriented, normal speech, no focal findings or movement disorder noted  Extremities - peripheral pulses normal, no pedal edema, no clubbing or cyanosis  Skin - normal coloration and turgor, no rashes, no suspicious skin lesions noted    LABS:    Chemistry        Component Value Date/Time     (L) 01/16/2020 0910    K 4.7 01/16/2020 0910    CL 93 (L) 01/16/2020 0910    CO2 25 01/16/2020 0910    BUN 17 01/16/2020 0910    CREATININE 1.35 (H) 01/16/2020 0910        Component Value Date/Time    CALCIUM 10.3 (H) 01/16/2020 0910    ALKPHOS 63 01/16/2020 0910    AST 27 01/16/2020 0910    ALT 16 01/16/2020 0910 call us back if blood pressure are above the target ranges or if it is low. Patient clearly understands and agrees to the instructions. We will need to continue to monitor muscle and liver enzymes, BUN, CR, and electrolytes. Details of medical condition explained and patient was warned about adverse consequences of uncontrolled medical conditions and possible side effects of prescribed medications. Patient was advised to go to the ER if he starts experiencing adverse effects of the medications. patient was instructed to call us back or go to nearby emergency room immediately if symptoms get worse or do not improve. Thank you for allowing me to participate in the care of your patient, please don't hesitate to contact me if you have any further questions.

## 2021-08-04 DIAGNOSIS — E78.5 DYSLIPIDEMIA: ICD-10-CM

## 2021-08-04 LAB
CHOLESTEROL, TOTAL: 191 MG/DL (ref 0–199)
HDLC SERPL-MCNC: 53 MG/DL (ref 40–59)
LDL CHOLESTEROL CALCULATED: 124 MG/DL (ref 0–129)
TRIGL SERPL-MCNC: 72 MG/DL (ref 0–150)

## 2021-08-30 ENCOUNTER — HOSPITAL ENCOUNTER (OUTPATIENT)
Dept: NON INVASIVE DIAGNOSTICS | Age: 61
Discharge: HOME | End: 2021-08-30
Payer: COMMERCIAL

## 2021-08-30 ENCOUNTER — HOSPITAL ENCOUNTER (OUTPATIENT)
Dept: CT IMAGING | Age: 61
Discharge: HOME | End: 2021-09-01
Payer: COMMERCIAL

## 2021-08-30 DIAGNOSIS — I35.0 NONRHEUMATIC AORTIC VALVE STENOSIS: ICD-10-CM

## 2021-08-30 DIAGNOSIS — I71.21 ASCENDING AORTIC ANEURYSM: ICD-10-CM

## 2021-08-30 LAB
LV EF: 60 %
LVEF MODALITY: NORMAL

## 2021-08-30 PROCEDURE — 93306 TTE W/DOPPLER COMPLETE: CPT

## 2021-08-30 PROCEDURE — 71275 CT ANGIOGRAPHY CHEST: CPT

## 2021-08-30 PROCEDURE — 6360000004 HC RX CONTRAST MEDICATION: Performed by: INTERNAL MEDICINE

## 2021-08-30 PROCEDURE — 2580000003 HC RX 258: Performed by: INTERNAL MEDICINE

## 2021-08-30 RX ORDER — SODIUM CHLORIDE 0.9 % (FLUSH) 0.9 %
10 SYRINGE (ML) INJECTION
Status: COMPLETED | OUTPATIENT
Start: 2021-08-30 | End: 2021-08-30

## 2021-08-30 RX ADMIN — SODIUM CHLORIDE, PRESERVATIVE FREE 10 ML: 5 INJECTION INTRAVENOUS at 07:57

## 2021-08-30 RX ADMIN — IOPAMIDOL 100 ML: 755 INJECTION, SOLUTION INTRAVENOUS at 07:57

## 2021-09-14 ENCOUNTER — OFFICE VISIT (OUTPATIENT)
Dept: CARDIOLOGY CLINIC | Age: 61
End: 2021-09-14
Payer: COMMERCIAL

## 2021-09-14 VITALS
RESPIRATION RATE: 18 BRPM | OXYGEN SATURATION: 99 % | HEART RATE: 56 BPM | SYSTOLIC BLOOD PRESSURE: 110 MMHG | DIASTOLIC BLOOD PRESSURE: 74 MMHG | BODY MASS INDEX: 21.43 KG/M2 | WEIGHT: 134.8 LBS | TEMPERATURE: 97.3 F

## 2021-09-14 DIAGNOSIS — I10 ESSENTIAL HYPERTENSION: ICD-10-CM

## 2021-09-14 DIAGNOSIS — I35.0 NONRHEUMATIC AORTIC VALVE STENOSIS: ICD-10-CM

## 2021-09-14 DIAGNOSIS — I71.21 ASCENDING AORTIC ANEURYSM: ICD-10-CM

## 2021-09-14 DIAGNOSIS — Z78.9 STATIN INTOLERANCE: Primary | ICD-10-CM

## 2021-09-14 PROCEDURE — G8420 CALC BMI NORM PARAMETERS: HCPCS | Performed by: INTERNAL MEDICINE

## 2021-09-14 PROCEDURE — G8427 DOCREV CUR MEDS BY ELIG CLIN: HCPCS | Performed by: INTERNAL MEDICINE

## 2021-09-14 PROCEDURE — 3017F COLORECTAL CA SCREEN DOC REV: CPT | Performed by: INTERNAL MEDICINE

## 2021-09-14 PROCEDURE — 1036F TOBACCO NON-USER: CPT | Performed by: INTERNAL MEDICINE

## 2021-09-14 PROCEDURE — 99214 OFFICE O/P EST MOD 30 MIN: CPT | Performed by: INTERNAL MEDICINE

## 2021-09-14 NOTE — PROGRESS NOTES
4-15-14: as above, Pt denies chest pain, dyspnea, dyspnea on exertion, change in exercise capacity, fatigue,  nausea, vomiting, diarrhea, constipation, motor weakness, insomnia, weight loss, syncope, dizziness, lightheadedness, palpitations, PND, orthopnea, or claudication. Compliant with meds. BP under good control. 5-1-14; as above, s/p echo with normal LVf, grade II DD, mild LVH mild to moderate AS, peak of 38, mean of 18, ARCHANA 1.45cm2, 1-2+ AI, Aneurysmal ascending aorta measuring 4.2cm  Pt denies chest pain, dyspnea, dyspnea on exertion, change in exercise capacity, fatigue,  nausea, vomiting, diarrhea, constipation, motor weakness, insomnia, weight loss, syncope, dizziness, lightheadedness, palpitations, PND, orthopnea, or claudication. 11-13-14: as above, Pt denies chest pain, dyspnea, dyspnea on exertion, change in exercise capacity, fatigue,  nausea, vomiting, diarrhea, constipation, motor weakness, insomnia, weight loss, syncope, dizziness, lightheadedness, palpitations, PND, orthopnea, or claudication. No hospitalizations. No LE edema. States he's active. 6-4-15: as above, s/p CT of chest with ascending aorta arch aneurysm measuring 4.6cm. S/p Echo with EF of 65%, grade II DD, mild AS, AI and MR, ascending aorta measuring 4.4cm. Pt denies chest pain, dyspnea, dyspnea on exertion, change in exercise capacity, fatigue,  nausea, vomiting, diarrhea, constipation, motor weakness, insomnia, weight loss, syncope, dizziness, lightheadedness, palpitations, PND, orthopnea, or claudication. 12-10-15: doing well. Pt denies chest pain, dyspnea, dyspnea on exertion, change in exercise capacity, fatigue,  nausea, vomiting, diarrhea, constipation, motor weakness, insomnia, weight loss, syncope, dizziness, lightheadedness, palpitations, PND, orthopnea, or claudication. No hospitalizations. BP is good today. Having muscle aches and joint pains from statin and stopped pravachol.  Has not had lipid profile since 2012.     6-9-16: Pt denies chest pain, dyspnea, dyspnea on exertion, change in exercise capacity, fatigue,  nausea, vomiting, diarrhea, constipation, motor weakness, insomnia, syncope, dizziness, lightheadedness, palpitations, PND, orthopnea, or claudication. Has lost some weight intentionally. BP is good, HR is good. No LE edema. Lipid profile in 2015 was ok. 6-20-16: CTA of chest with unchanged ascending aorta aneurysm measuring 4.5cm as compared to previous exam on 6/2015.s s/p ECHO with EF of 55%, moderate AS with peak velocity of 3.2m/sec, peak of 41mmHg, mean of 21mmHg, ARCHANA of 1.2cm2. Trace MR, mild AI, moderate LVH, RVSP of 36mmHg. Pt denies chest pain, dyspnea, dyspnea on exertion, change in exercise capacity, fatigue,  nausea, vomiting, diarrhea, constipation, motor weakness, insomnia, weight loss, syncope, dizziness, lightheadedness, palpitations, PND, orthopnea, or claudication. BP is good. 12-29-16: Pt denies chest pain, dyspnea, dyspnea on exertion, change in exercise capacity, fatigue,  nausea, vomiting, diarrhea, constipation, motor weakness, insomnia, weight loss, syncope, dizziness, lightheadedness, palpitations, PND, orthopnea, or claudication. BP and hr are good. No LE discoloration or ulcers. No LE edema. No CHF type symptoms. Lipid profile is abnormal lat check in 12/2015. Not able to tolerate statins. Has tried zocor, pravachol. He gets bad muscle aches. Does have a hx of a mini stroke/TIA.     6-29-17: Pt denies chest pain, dyspnea, dyspnea on exertion, change in exercise capacity, fatigue,  nausea, vomiting, diarrhea, constipation, motor weakness, insomnia, weight loss, syncope, dizziness, lightheadedness, palpitations, PND, orthopnea, or claudication. No nitro use. BP and hr are good. CAD is stable. No LE discoloration or ulcers. No LE edema. No CHF type symptoms. Lipid profile is normal.       S/p ECHO. Normal mitral valve structure and function.   The aortic valve area is 0.9 cm2 with a maximum gradient of 52mmHg and a mean gradient of 24mmHg. Moderately severe AS. Normal tricuspid valve structure and function. There is mild ( 1 +) tricuspid regurgitation with estimated RVSP of 40 mm Hg. Normal pulmonic valve structure and function. Moderately dilated left atrium. Left ventricular ejection fraction is visually estimated at 55%. Pseudonormal filling pattern noted. Moderate concentric left ventricular hypertrophy. Normal right atrium. Normal right ventricle structure and function. Dilation of ascending aorta 4.9 cm  No evidence of pericardial effusion. No evidence of pleural effusion. S/p CTA of chest.          Impression   1. STABLE 4.5 CM ASCENDING THORACIC AORTA ANEURYSM UNCHANGED FROM 6/21/2016.   2. NO CTA EVIDENCE OF A PULMONARY EMBOLISM OR AORTIC DISSECTION. 3. MILD CARDIOMEGALY WITHOUT A PERICARDIAL EFFUSION. 4. LUNGS APPEAR CLEAR.             12-28-17: FOLLOWING WITH DR. Mandeep Martin NOW. Pt denies chest pain, dyspnea, dyspnea on exertion, change in exercise capacity, fatigue,  nausea, vomiting, diarrhea, constipation, motor weakness, insomnia, weight loss, syncope, dizziness, lightheadedness, palpitations, PND, orthopnea, or claudication. No nitro use. BP and hr are good. CAD is stable. No LE discoloration or ulcers. No LE edema. No CHF type symptoms. Lipid profile is normal.  He is active at work and home without any issues. Compliant with meds. Able to tolerate 10mg lipitor now. No muscle cramps. 6-28-18: EKG with NSR, LBBB. S/p ECHO with EF of 55%, MILD AS, MILD AI, GRADE II DD. S/P CTA of chest with ascending aorta measuring 4.6cm, unchanged from previous. Pt denies chest pain, dyspnea, dyspnea on exertion, change in exercise capacity, fatigue,  nausea, vomiting, diarrhea, constipation, motor weakness, insomnia, weight loss, syncope, dizziness, lightheadedness, palpitations, PND, orthopnea, or claudication. No nitro use. BP and hr are good.  CAD is stable. No LE discoloration or ulcers. No LE edema. No CHF type symptoms. Lipid profile is normal. No recent hospitalization. No change in meds. 12-27-18: doing well overall. Pt denies chest pain, dyspnea, dyspnea on exertion, change in exercise capacity, fatigue,  nausea, vomiting, diarrhea, constipation, motor weakness, insomnia, weight loss, syncope, dizziness, lightheadedness, palpitations, PND, orthopnea, or claudication. No nitro use. BP and hr are good. No LE discoloration or ulcers. No LE edema. No CHF type symptoms. Lipid profile is normal. No recent hospitalization. No change in meds. Able to tolerate Lipitor so far this time. 6-27-19: Pt denies chest pain, dyspnea, dyspnea on exertion, change in exercise capacity, fatigue,  nausea, vomiting, diarrhea, constipation, motor weakness, insomnia, weight loss, syncope, dizziness, lightheadedness, palpitations, PND, orthopnea, or claudication. No nitro use. BP and hr are good. CAD is stable. No LE discoloration or ulcers. No LE edema. No CHF type symptoms. Lipid profile is normal. No recent hospitalization. No change in meds. Has CKD, stage III. EKG with NSR, LBBB. S/p CTA of chest with stable Ectatic ascending thoracic aorta with maximum diameter of 4.4 cm. S/p ECHO with EF of 60%, 1-2+ AI, 2+ TR, RVSP of 49mmHg, mild to mod PHNT, grade I DD, moderate AS, mean gradient of 33mmHg, velocity of 3.49m/s, 0.95cm2 ARCHANA. 12-19-19: Pt denies chest pain, dyspnea, dyspnea on exertion, change in exercise capacity, fatigue,  nausea, vomiting, diarrhea, constipation, motor weakness, insomnia, weight loss, syncope, dizziness, lightheadedness, palpitations, PND, orthopnea, or claudication. No nitro use. BP and hr are good. CAD is stable. No LE discoloration or ulcers. No LE edema. No CHF type symptoms. Lipid profile is normal. No recent hospitalization. No change in meds. Hx of bicuspid AV and Ascending aorta aneurysm.    Could not tolerate Atorvastatin due to Problem List   Diagnosis    HTN (hypertension)    Dyslipidemia    Hx-TIA (transient ischemic attack)    Rheumatic fever    Seasonal allergies    Aortic stenosis, mild    Pulmonary hypertension-moderate    LBBB (left bundle branch block)    Aortic valve [id number]+    Ascending aortic aneurysm (HCC)    Statin intolerance    Hyperplastic polyp of descending colon       Current Outpatient Medications   Medication Sig Dispense Refill    lisinopril (PRINIVIL;ZESTRIL) 10 MG tablet Take 1 tablet by mouth daily 90 tablet 3    metoprolol tartrate (LOPRESSOR) 25 MG tablet Take 1 tablet by mouth 2 times daily 180 tablet 3    Cholecalciferol (VITAMIN D) 2000 units TABS tablet Take 1 tablet by mouth daily 90 tablet 1    aspirin 81 MG EC tablet Take 81 mg by mouth daily. No current facility-administered medications for this visit. ALLERGIES: Statins    Review of Systems:  General ROS: negative  Psychological ROS: negative  Hematological and Lymphatic ROS: No history of blood clots or bleeding disorder. Respiratory ROS: no cough, shortness of breath, or wheezing  Cardiovascular ROS: no chest pain or dyspnea on exertion  Gastrointestinal ROS: no abdominal pain, change in bowel habits, or black or bloody stools  Genito-Urinary ROS: negative  Musculoskeletal ROS: negative  Neurological ROS: no TIA or stroke symptoms  Dermatological ROS: neg      VITALS:  Blood pressure 110/74, pulse 56, temperature 97.3 °F (36.3 °C), temperature source Temporal, resp. rate 18, weight 134 lb 12.8 oz (61.1 kg), SpO2 99 %. Body mass index is 21.43 kg/m². Physical Examination:  General appearance - alert, well appearing, and in no distress  Mental status - alert, oriented to person, place, and time  Neck - Neck is supple, no JVD or carotid bruits. No thyromegaly or adenopathy.    Chest - clear to auscultation, no wheezes, rales or rhonchi, symmetric air entry  Heart - normal rate, regular rhythm, normal S1, S2, + 3/6 systolic murmurs, rubs, clicks or gallops  Abdomen - soft, nontender, nondistended, no masses or organomegaly  Neurological - alert, oriented, normal speech, no focal findings or movement disorder noted  Extremities - peripheral pulses normal, no pedal edema, no clubbing or cyanosis  Skin - normal coloration and turgor, no rashes, no suspicious skin lesions noted    LABS:    Chemistry        Component Value Date/Time     (L) 01/16/2020 0910    K 4.7 01/16/2020 0910    CL 93 (L) 01/16/2020 0910    CO2 25 01/16/2020 0910    BUN 17 01/16/2020 0910    CREATININE 1.35 (H) 01/16/2020 0910        Component Value Date/Time    CALCIUM 10.3 (H) 01/16/2020 0910    ALKPHOS 63 01/16/2020 0910    AST 27 01/16/2020 0910    ALT 16 01/16/2020 0910    BILITOT 1.5 (H) 01/16/2020 0910            Lab Results   Component Value Date    WBC 12.9 (H) 12/28/2018    HGB 17.6 12/28/2018    HCT 52.8 (H) 12/28/2018    MCV 84.7 12/28/2018     12/28/2018       No components found for: CHLPL  Lab Results   Component Value Date    TRIG 72 08/04/2021    TRIG 90 01/16/2020    TRIG 125 12/28/2018     Lab Results   Component Value Date    HDL 53 08/04/2021    HDL 62 (H) 01/16/2020    HDL 43 12/28/2018     Lab Results   Component Value Date    LDLCALC 124 08/04/2021    LDLCALC 140 (H) 01/16/2020    LDLCALC 81 12/28/2018       EKG: NSR, LBBB. ASSESSMENT:     Diagnosis Orders   1. Aortic stenosis, moderate    2. Essential hypertension  lisinopril (PRINIVIL;ZESTRIL) 10 MG tablet    metoprolol tartrate (LOPRESSOR) 25 MG tablet   3. Ascending aortic aneurysm ([address]     4. Aortic valve insufficiency, etiology of cardiac valve disease unspecified     5. Pulmonary hypertension-moderate     6. LBBB (left bundle branch block)     7. Statin intolerance           PLAN:         As always, aggressive risk factor modification is strongly recommended.  We should adhere to the [address] VII guidelines for HTN management and the NCEP ATP III guidelines for LDL-C management. The nature of cardiac risk has been fully discussed with this patient. I have made him aware of his LDL target goal given his cardiovascular risk analysis. I have discussed the appropriate diet. The need for lifelong compliance in order to reduce risk is stressed. A regular exercise program is recommended to help achieve and maintain normal body weight, fitness and improve lipid balance. Continue medications at current doses. Refer to [facility] CVTS for AVR/aortic aneurysm repair evaluation. Plan for [address] given patient will need AVR/ROOT repair. Patient was advised and encouraged to check blood pressure at home or at a pharmacy, maintain a logbook, and also call us back if blood pressure are above the target ranges or if it is low. Patient clearly understands and agrees to the instructions. We will need to continue to monitor muscle and liver enzymes, BUN, CR, and electrolytes. Details of medical condition explained and patient was warned about adverse consequences of uncontrolled medical conditions and possible side effects of prescribed medications. Patient was advised to go to the ER if he starts experiencing adverse effects of the medications. patient was instructed to call us back or go to nearby emergency room immediately if symptoms get worse or do not improve. Thank you for allowing me to participate in the care of your patient, please don't hesitate to contact me if you have any further questions.

## 2021-09-17 ENCOUNTER — TELEPHONE (OUTPATIENT)
Dept: CARDIOLOGY CLINIC | Age: 61
End: 2021-09-17

## 2021-09-17 NOTE — TELEPHONE ENCOUNTER
Per Dr Nathan Phelps should be ok to wait until 9-. No med adjustments at this time.     Patient aware

## 2021-09-27 ENCOUNTER — HOSPITAL ENCOUNTER (OUTPATIENT)
Dept: CARDIAC CATH/INVASIVE PROCEDURES | Age: 61
Discharge: HOME | End: 2021-09-27
Attending: INTERNAL MEDICINE | Admitting: INTERNAL MEDICINE
Payer: COMMERCIAL

## 2021-09-27 VITALS
RESPIRATION RATE: 17 BRPM | SYSTOLIC BLOOD PRESSURE: 107 MMHG | HEIGHT: 67 IN | HEART RATE: 60 BPM | TEMPERATURE: 97.3 F | DIASTOLIC BLOOD PRESSURE: 67 MMHG | OXYGEN SATURATION: 100 % | WEIGHT: 130 LBS | BODY MASS INDEX: 20.4 KG/M2

## 2021-09-27 LAB
ANION GAP SERPL CALCULATED.3IONS-SCNC: 9 MEQ/L (ref 9–15)
BUN BLDV-MCNC: 18 MG/DL (ref 6–20)
CALCIUM SERPL-MCNC: 9.4 MG/DL (ref 8.5–9.9)
CHLORIDE BLD-SCNC: 101 MEQ/L (ref 95–107)
CO2: 28 MEQ/L (ref 20–31)
CREAT SERPL-MCNC: 1.27 MG/DL (ref 0.7–1.2)
GFR AFRICAN AMERICAN: >60
GFR NON-AFRICAN AMERICAN: 58
GLUCOSE BLD-MCNC: 114 MG/DL (ref 70–99)
HCT VFR BLD CALC: 47.5 % (ref 42–52)
HEMOGLOBIN: 16.2 G/DL (ref 14–18)
INR BLD: 0.9
MCH RBC QN AUTO: 29 PG (ref 27–31.3)
MCHC RBC AUTO-ENTMCNC: 34.1 % (ref 33–37)
MCV RBC AUTO: 85 FL (ref 80–100)
PDW BLD-RTO: 13.7 % (ref 11.5–14.5)
PLATELET # BLD: 193 K/UL (ref 130–400)
POTASSIUM SERPL-SCNC: 4.2 MEQ/L (ref 3.4–4.9)
PROTHROMBIN TIME: 12.5 SEC (ref 12.3–14.9)
RBC # BLD: 5.59 M/UL (ref 4.7–6.1)
SODIUM BLD-SCNC: 138 MEQ/L (ref 135–144)
WBC # BLD: 6.7 K/UL (ref 4.8–10.8)

## 2021-09-27 PROCEDURE — C1894 INTRO/SHEATH, NON-LASER: HCPCS

## 2021-09-27 PROCEDURE — 93454 CORONARY ARTERY ANGIO S&I: CPT | Performed by: INTERNAL MEDICINE

## 2021-09-27 PROCEDURE — 99152 MOD SED SAME PHYS/QHP 5/>YRS: CPT

## 2021-09-27 PROCEDURE — 2500000003 HC RX 250 WO HCPCS

## 2021-09-27 PROCEDURE — C1769 GUIDE WIRE: HCPCS

## 2021-09-27 PROCEDURE — 80048 BASIC METABOLIC PNL TOTAL CA: CPT

## 2021-09-27 PROCEDURE — 6360000002 HC RX W HCPCS

## 2021-09-27 PROCEDURE — 6370000000 HC RX 637 (ALT 250 FOR IP): Performed by: INTERNAL MEDICINE

## 2021-09-27 PROCEDURE — 93458 L HRT ARTERY/VENTRICLE ANGIO: CPT | Performed by: INTERNAL MEDICINE

## 2021-09-27 PROCEDURE — 6360000004 HC RX CONTRAST MEDICATION

## 2021-09-27 PROCEDURE — 2709999900 HC NON-CHARGEABLE SUPPLY

## 2021-09-27 PROCEDURE — 85610 PROTHROMBIN TIME: CPT

## 2021-09-27 PROCEDURE — 2580000003 HC RX 258

## 2021-09-27 PROCEDURE — 85027 COMPLETE CBC AUTOMATED: CPT

## 2021-09-27 PROCEDURE — 2580000003 HC RX 258: Performed by: INTERNAL MEDICINE

## 2021-09-27 RX ORDER — ONDANSETRON 2 MG/ML
4 INJECTION INTRAMUSCULAR; INTRAVENOUS EVERY 6 HOURS PRN
Status: DISCONTINUED | OUTPATIENT
Start: 2021-09-27 | End: 2021-09-27 | Stop reason: HOSPADM

## 2021-09-27 RX ORDER — ASPIRIN 81 MG/1
81 TABLET, CHEWABLE ORAL ONCE
Status: COMPLETED | OUTPATIENT
Start: 2021-09-27 | End: 2021-09-27

## 2021-09-27 RX ORDER — SODIUM CHLORIDE 9 MG/ML
25 INJECTION, SOLUTION INTRAVENOUS PRN
Status: DISCONTINUED | OUTPATIENT
Start: 2021-09-27 | End: 2021-09-27 | Stop reason: HOSPADM

## 2021-09-27 RX ORDER — SODIUM CHLORIDE 0.9 % (FLUSH) 0.9 %
5-40 SYRINGE (ML) INJECTION EVERY 12 HOURS SCHEDULED
Status: DISCONTINUED | OUTPATIENT
Start: 2021-09-27 | End: 2021-09-27 | Stop reason: HOSPADM

## 2021-09-27 RX ORDER — SODIUM CHLORIDE 9 MG/ML
INJECTION, SOLUTION INTRAVENOUS CONTINUOUS
Status: DISCONTINUED | OUTPATIENT
Start: 2021-09-27 | End: 2021-09-27 | Stop reason: HOSPADM

## 2021-09-27 RX ORDER — ACETAMINOPHEN 325 MG/1
650 TABLET ORAL EVERY 4 HOURS PRN
Status: DISCONTINUED | OUTPATIENT
Start: 2021-09-27 | End: 2021-09-27 | Stop reason: HOSPADM

## 2021-09-27 RX ORDER — HYDRALAZINE HYDROCHLORIDE 20 MG/ML
10 INJECTION INTRAMUSCULAR; INTRAVENOUS EVERY 10 MIN PRN
Status: DISCONTINUED | OUTPATIENT
Start: 2021-09-27 | End: 2021-09-27 | Stop reason: HOSPADM

## 2021-09-27 RX ORDER — SODIUM CHLORIDE 0.9 % (FLUSH) 0.9 %
5-40 SYRINGE (ML) INJECTION PRN
Status: DISCONTINUED | OUTPATIENT
Start: 2021-09-27 | End: 2021-09-27 | Stop reason: HOSPADM

## 2021-09-27 RX ORDER — LABETALOL HYDROCHLORIDE 5 MG/ML
10 INJECTION, SOLUTION INTRAVENOUS EVERY 30 MIN PRN
Status: DISCONTINUED | OUTPATIENT
Start: 2021-09-27 | End: 2021-09-27 | Stop reason: HOSPADM

## 2021-09-27 RX ADMIN — SODIUM CHLORIDE: 9 INJECTION, SOLUTION INTRAVENOUS at 08:30

## 2021-09-27 RX ADMIN — ASPIRIN 81 MG: 81 TABLET, CHEWABLE ORAL at 08:31

## 2021-09-27 NOTE — PROGRESS NOTES
Pt arrived pre/post cath. Pt alert and oriented x4. Pt denies pain. Reviewed pt rights and  Responsibilities. Labs drawn and sent. Consent obtained. Pt prepped for procedure.

## 2021-09-27 NOTE — BRIEF OP NOTE
Section of Cardiology  Adult Brief Cardiac Cath Procedure Note        Procedure(s):  b/l coronary angio    Pre-operative Diagnosis:  Severe AS    H&P Status: Completed and reviewed.      Post-operative Diagnosis:      Unable to cross AV    LM normal   LAD mid 80% stenosis followed by another 60-70% mid stenosis  CX large caliber, dominant, mild disease  RCA small, mild disease    Findings:  See full report    Complications:  none    Primary Proceduralist:   Dr.Wes Benito DO    Plan    CVTS for AVR and LIMA to LAD      Full procedure note to follow

## 2021-09-27 NOTE — PROGRESS NOTES
Pt arrived pre/post cath from cath lab. Pt with quikclot to right ulnar. Dressing intact. No bleeding or hematoma noted.

## 2021-10-28 ENCOUNTER — OFFICE VISIT (OUTPATIENT)
Dept: CARDIOLOGY CLINIC | Age: 61
End: 2021-10-28
Payer: COMMERCIAL

## 2021-10-28 VITALS
HEART RATE: 64 BPM | OXYGEN SATURATION: 96 % | BODY MASS INDEX: 20.99 KG/M2 | WEIGHT: 134 LBS | TEMPERATURE: 97.3 F | SYSTOLIC BLOOD PRESSURE: 126 MMHG | DIASTOLIC BLOOD PRESSURE: 84 MMHG

## 2021-10-28 DIAGNOSIS — I25.10 CORONARY ARTERY DISEASE INVOLVING NATIVE CORONARY ARTERY OF NATIVE HEART WITHOUT ANGINA PECTORIS: ICD-10-CM

## 2021-10-28 PROCEDURE — 3017F COLORECTAL CA SCREEN DOC REV: CPT | Performed by: INTERNAL MEDICINE

## 2021-10-28 PROCEDURE — G8427 DOCREV CUR MEDS BY ELIG CLIN: HCPCS | Performed by: INTERNAL MEDICINE

## 2021-10-28 PROCEDURE — 1036F TOBACCO NON-USER: CPT | Performed by: INTERNAL MEDICINE

## 2021-10-28 PROCEDURE — G8484 FLU IMMUNIZE NO ADMIN: HCPCS | Performed by: INTERNAL MEDICINE

## 2021-10-28 PROCEDURE — G8420 CALC BMI NORM PARAMETERS: HCPCS | Performed by: INTERNAL MEDICINE

## 2021-10-28 PROCEDURE — 99214 OFFICE O/P EST MOD 30 MIN: CPT | Performed by: INTERNAL MEDICINE

## 2022-08-04 NOTE — PROGRESS NOTES
Chief Complaint   Patient presents with    6 Month Follow-Up    Cardiac Valve Problem    Hypertension     Patient presents for follow up medical evaluation. Patient is followed on a regular basis by Dr. Abigail Godinez. Pt denies chest pain, shortness of breath, nausea, vomiting, diarrhea, constipation, motor weakness, insomnia, weight loss, syncope, dizziness, lightheadedness, PND, orthopnea, or claudication. Compliant with meds. S/P 2 D echo which showed mild AS with an estimated ARCHANA of 1.6cm2, with a mean Gradient of 8mmHg, mild to moderate AI, mild LVH, and grade I diastolic dysfunction. Aortic root was 4.0cm.     9-27-12: as above. Patient developed muscle aches in the arms and stoppped Zocor for a while. He restarted it and symptoms returned. Pt denies chest pain, dyspnea, dyspnea on exertion, change in exercise capacity, fatigue, nausea, vomiting, diarrhea, constipation, motor weakness, insomnia, weight loss, syncope, dizziness, lightheadedness, palpitations, PND, orthopnea, or claudication. Patient remains very active in martial arts. 3-28-13: as above, s/p echo with normal LVF, Mild AS and AI, aortic root measuring 4.01cm. CT of chest showed ascending aortic aneurysm measuring 4.2cm. Doing well overall, no recent hospitalization, exercising on a regular without any difficulties. Pt denies chest pain, dyspnea, dyspnea on exertion, change in exercise capacity, fatigue, nausea, vomiting, diarrhea, constipation, motor weakness, insomnia, weight loss, syncope, dizziness, lightheadedness, palpitations, PND, orthopnea, or claudication. 9-24-13: as above, stopped pravachol due to joint aches. Could not tolerate. Pt denies chest pain, dyspnea, dyspnea on exertion, change in exercise capacity, fatigue,  nausea, vomiting, diarrhea, constipation, motor weakness, insomnia, weight loss, syncope, dizziness, lightheadedness, palpitations, PND, orthopnea, or claudication.      4-15-14: as above, Pt denies chest pain, [[email or web address]] : 64 yo male with history of ESRD on HD, pad s/p left lower extremity TMA.  Patient recently noted discoloration of the amputation site.  The patient underwent bilateral leg angiogram with angioplasty and stenting.\par He is here for follow-up\par Patient currently has oxygen dressing on the left foot. left foot has improved\par no issues with left fistula dyspnea, dyspnea on exertion, change in exercise capacity, fatigue,  nausea, vomiting, diarrhea, constipation, motor weakness, insomnia, weight loss, syncope, dizziness, lightheadedness, palpitations, PND, orthopnea, or claudication. Compliant with meds. BP under good control. 5-1-14; as above, s/p echo with normal LVf, grade II DD, mild LVH mild to moderate AS, peak of 38, mean of 18, ARCHANA 1.45cm2, 1-2+ AI, Aneurysmal ascending aorta measuring 4.2cm  Pt denies chest pain, dyspnea, dyspnea on exertion, change in exercise capacity, fatigue,  nausea, vomiting, diarrhea, constipation, motor weakness, insomnia, weight loss, syncope, dizziness, lightheadedness, palpitations, PND, orthopnea, or claudication. 11-13-14: as above, Pt denies chest pain, dyspnea, dyspnea on exertion, change in exercise capacity, fatigue,  nausea, vomiting, diarrhea, constipation, motor weakness, insomnia, weight loss, syncope, dizziness, lightheadedness, palpitations, PND, orthopnea, or claudication. No hospitalizations. No LE edema. States he's active. 6-4-15: as above, s/p CT of chest with ascending aorta arch aneurysm measuring 4.6cm. S/p Echo with EF of 65%, grade II DD, mild AS, AI and MR, ascending aorta measuring 4.4cm. Pt denies chest pain, dyspnea, dyspnea on exertion, change in exercise capacity, fatigue,  nausea, vomiting, diarrhea, constipation, motor weakness, insomnia, weight loss, syncope, dizziness, lightheadedness, palpitations, PND, orthopnea, or claudication. 12-10-15: doing well. Pt denies chest pain, dyspnea, dyspnea on exertion, change in exercise capacity, fatigue,  nausea, vomiting, diarrhea, constipation, motor weakness, insomnia, weight loss, syncope, dizziness, lightheadedness, palpitations, PND, orthopnea, or claudication. No hospitalizations. BP is good today. Having muscle aches and joint pains from statin and stopped pravachol.  Has not had lipid profile since 2012.     6-9-16: Pt denies chest pain, dyspnea, dyspnea on exertion, change in exercise capacity, fatigue,  nausea, vomiting, diarrhea, constipation, motor weakness, insomnia, syncope, dizziness, lightheadedness, palpitations, PND, orthopnea, or claudication. Has lost some weight intentionally. BP is good, HR is good. No LE edema. Lipid profile in 2015 was ok. 6-20-16: CTA of chest with unchanged ascending aorta aneurysm measuring 4.5cm as compared to previous exam on 6/2015.s s/p ECHO with EF of 55%, moderate AS with peak velocity of 3.2m/sec, peak of 41mmHg, mean of 21mmHg, ARCHANA of 1.2cm2. Trace MR, mild AI, moderate LVH, RVSP of 36mmHg. Pt denies chest pain, dyspnea, dyspnea on exertion, change in exercise capacity, fatigue,  nausea, vomiting, diarrhea, constipation, motor weakness, insomnia, weight loss, syncope, dizziness, lightheadedness, palpitations, PND, orthopnea, or claudication. BP is good. 12-29-16: Pt denies chest pain, dyspnea, dyspnea on exertion, change in exercise capacity, fatigue,  nausea, vomiting, diarrhea, constipation, motor weakness, insomnia, weight loss, syncope, dizziness, lightheadedness, palpitations, PND, orthopnea, or claudication. BP and hr are good. No LE discoloration or ulcers. No LE edema. No CHF type symptoms. Lipid profile is abnormal lat check in 12/2015. Not able to tolerate statins. Has tried zocor, pravachol. He gets bad muscle aches. Does have a hx of a mini stroke/TIA.     6-29-17: Pt denies chest pain, dyspnea, dyspnea on exertion, change in exercise capacity, fatigue,  nausea, vomiting, diarrhea, constipation, motor weakness, insomnia, weight loss, syncope, dizziness, lightheadedness, palpitations, PND, orthopnea, or claudication. No nitro use. BP and hr are good. CAD is stable. No LE discoloration or ulcers. No LE edema. No CHF type symptoms. Lipid profile is normal.       S/p ECHO. Normal mitral valve structure and function.   The aortic valve area is 0.9 cm2 with a maximum gradient of 52mmHg and a mean gradient of 24mmHg. Moderately severe AS. Normal tricuspid valve structure and function. There is mild ( 1 +) tricuspid regurgitation with estimated RVSP of 40 mm Hg. Normal pulmonic valve structure and function. Moderately dilated left atrium. Left ventricular ejection fraction is visually estimated at 55%. Pseudonormal filling pattern noted. Moderate concentric left ventricular hypertrophy. Normal right atrium. Normal right ventricle structure and function. Dilation of ascending aorta 4.9 cm  No evidence of pericardial effusion. No evidence of pleural effusion. S/p CTA of chest.          Impression   1. STABLE 4.5 CM ASCENDING THORACIC AORTA ANEURYSM UNCHANGED FROM 6/21/2016.   2. NO CTA EVIDENCE OF A PULMONARY EMBOLISM OR AORTIC DISSECTION. 3. MILD CARDIOMEGALY WITHOUT A PERICARDIAL EFFUSION. 4. LUNGS APPEAR CLEAR.             12-28-17: FOLLOWING WITH DR. Russel Ramirez NOW. Pt denies chest pain, dyspnea, dyspnea on exertion, change in exercise capacity, fatigue,  nausea, vomiting, diarrhea, constipation, motor weakness, insomnia, weight loss, syncope, dizziness, lightheadedness, palpitations, PND, orthopnea, or claudication. No nitro use. BP and hr are good. CAD is stable. No LE discoloration or ulcers. No LE edema. No CHF type symptoms. Lipid profile is normal.  He is active at work and home without any issues. Compliant with meds. Able to tolerate 10mg lipitor now. No muscle cramps. 6-28-18: EKG with NSR, LBBB. S/p ECHO with EF of 55%, MILD AS, MILD AI, GRADE II DD. S/P CTA of chest with ascending aorta measuring 4.6cm, unchanged from previous. Pt denies chest pain, dyspnea, dyspnea on exertion, change in exercise capacity, fatigue,  nausea, vomiting, diarrhea, constipation, motor weakness, insomnia, weight loss, syncope, dizziness, lightheadedness, palpitations, PND, orthopnea, or claudication. No nitro use. BP and hr are good. CAD is stable. No LE discoloration or ulcers.  No LE edema. No CHF type symptoms. Lipid profile is normal. No recent hospitalization. No change in meds. 12-27-18: doing well overall. Pt denies chest pain, dyspnea, dyspnea on exertion, change in exercise capacity, fatigue,  nausea, vomiting, diarrhea, constipation, motor weakness, insomnia, weight loss, syncope, dizziness, lightheadedness, palpitations, PND, orthopnea, or claudication. No nitro use. BP and hr are good. No LE discoloration or ulcers. No LE edema. No CHF type symptoms. Lipid profile is normal. No recent hospitalization. No change in meds. Able to tolerate Lipitor so far this time. 6-27-19: Pt denies chest pain, dyspnea, dyspnea on exertion, change in exercise capacity, fatigue,  nausea, vomiting, diarrhea, constipation, motor weakness, insomnia, weight loss, syncope, dizziness, lightheadedness, palpitations, PND, orthopnea, or claudication. No nitro use. BP and hr are good. CAD is stable. No LE discoloration or ulcers. No LE edema. No CHF type symptoms. Lipid profile is normal. No recent hospitalization. No change in meds. Has CKD, stage III. EKG with NSR, LBBB. S/p CTA of chest with stable Ectatic ascending thoracic aorta with maximum diameter of 4.4 cm. S/p ECHO with EF of 60%, 1-2+ AI, 2+ TR, RVSP of 49mmHg, mild to mod PHNT, grade I DD, moderate AS, mean gradient of 33mmHg, velocity of 3.49m/s, 0.95cm2 ARCHANA.          Patient Active Problem List   Diagnosis    HTN (hypertension)    Dyslipidemia    Hx-TIA (transient ischemic attack)    Rheumatic fever    Seasonal allergies    Aortic stenosis, mild    Pulmonary hypertension-moderate    LBBB (left bundle branch block)    Aortic valve [id number]+    Ascending aortic aneurysm (HCC)    Statin intolerance    Hyperplastic polyp of descending colon       Current Outpatient Medications   Medication Sig Dispense Refill    atorvastatin (LIPITOR) 10 MG tablet Take 1 tablet by mouth daily 30 tablet 11    lisinopril (PRINIVIL;ZESTRIL) 10 MG tablet TAKE 1 TABLET BY MOUTH ONCE DAILY 30 tablet 11    metoprolol tartrate (LOPRESSOR) 25 MG tablet TAKE 1 TABLET BY MOUTH TWICE DAILY 60 tablet 11    Cholecalciferol (VITAMIN D) 2000 units TABS tablet Take 1 tablet by mouth daily 90 tablet 1    aspirin 81 MG EC tablet Take 81 mg by mouth daily. No current facility-administered medications for this visit. ALLERGIES: Statins    Review of Systems:  General ROS: negative  Psychological ROS: negative  Hematological and Lymphatic ROS: No history of blood clots or bleeding disorder. Respiratory ROS: no cough, shortness of breath, or wheezing  Cardiovascular ROS: no chest pain or dyspnea on exertion  Gastrointestinal ROS: no abdominal pain, change in bowel habits, or black or bloody stools  Genito-Urinary ROS: negative  Musculoskeletal ROS: negative  Neurological ROS: no TIA or stroke symptoms  Dermatological ROS: neg      VITALS:  Blood pressure 122/74, pulse 60, resp. rate 14, weight 142 lb 3.2 oz (64.5 kg), SpO2 98 %. Body mass index is 22.61 kg/m². Physical Examination:  General appearance - alert, well appearing, and in no distress  Mental status - alert, oriented to person, place, and time  Neck - Neck is supple, no JVD or carotid bruits. No thyromegaly or adenopathy.    Chest - clear to auscultation, no wheezes, rales or rhonchi, symmetric air entry  Heart - normal rate, regular rhythm, normal S1, S2, no murmurs, rubs, clicks or gallops  Abdomen - soft, nontender, nondistended, no masses or organomegaly  Neurological - alert, oriented, normal speech, no focal findings or movement disorder noted  Extremities - peripheral pulses normal, no pedal edema, no clubbing or cyanosis  Skin - normal coloration and turgor, no rashes, no suspicious skin lesions noted    LABS:    Chemistry        Component Value Date/Time     12/28/2018 0744    K 4.9 12/28/2018 0744     12/28/2018 0744    CO2 28 12/28/2018 0744    BUN 21 (H) 12/28/2018 0744    CREATININE 1.29 (H) 12/28/2018 0744        Component Value Date/Time    CALCIUM 9.7 12/28/2018 0744    ALKPHOS 82 12/28/2018 0744    AST 24 12/28/2018 0744    ALT 26 12/28/2018 0744    BILITOT 0.8 12/28/2018 0744            Lab Results   Component Value Date    WBC 12.9 (H) 12/28/2018    HGB 17.6 12/28/2018    HCT 52.8 (H) 12/28/2018    MCV 84.7 12/28/2018     12/28/2018       No components found for: CHLPL  Lab Results   Component Value Date    TRIG 125 12/28/2018    TRIG 109 06/18/2018    TRIG 111 01/19/2018     Lab Results   Component Value Date    HDL 43 12/28/2018    HDL 48 06/18/2018    HDL 43 01/19/2018     Lab Results   Component Value Date    LDLCALC 81 12/28/2018    LDLCALC 81 06/18/2018    LDLCALC 74 01/19/2018       EKG: NSR, LBBB. ASSESSMENT:     Diagnosis Orders   1. Aortic stenosis, moderate    2. Essential hypertension  lisinopril (PRINIVIL;ZESTRIL) 10 MG tablet    metoprolol tartrate (LOPRESSOR) 25 MG tablet   3. Ascending aortic aneurysm ([address]     4. Aortic valve insufficiency, etiology of cardiac valve disease unspecified     5. Pulmonary hypertension-moderate     6. LBBB (left bundle branch block)     7. Statin intolerance           PLAN:     Patient will need to continue to follow up with you for their general medical care and return to see me in the office in 6 months    As always, aggressive risk factor modification is strongly recommended. We should adhere to the [address] guidelines for HTN management and the NCEP ATP III guidelines for LDL-C management. The nature of cardiac risk has been fully discussed with this patient. I have made him aware of his LDL target goal given his cardiovascular risk analysis. I have discussed the appropriate diet. The need for lifelong compliance in order to reduce risk is stressed. A regular exercise program is recommended to help achieve and maintain normal body weight, fitness and improve lipid balance.      Continue medications at current doses. Will check CTA of chest without contrast and ECHO in 12 months    EKG at next office visit. Avoid nSAIDS. Patient was advised and encouraged to check blood pressure at home or at a pharmacy, maintain a logbook, and also call us back if blood pressure are above the target ranges or if it is low. Patient clearly understands and agrees to the instructions. We will need to continue to monitor muscle and liver enzymes, BUN, CR, and electrolytes. Details of medical condition explained and patient was warned about adverse consequences of uncontrolled medical conditions and possible side effects of prescribed medications. Patient was advised to go to the ER if he starts experiencing adverse effects of the medications. patient was instructed to call us back or go to nearby emergency room immediately if symptoms get worse or do not improve. Thank you for allowing me to participate in the care of your patient, please don't hesitate to contact me if you have any further questions.